# Patient Record
Sex: MALE | Race: WHITE | NOT HISPANIC OR LATINO
[De-identification: names, ages, dates, MRNs, and addresses within clinical notes are randomized per-mention and may not be internally consistent; named-entity substitution may affect disease eponyms.]

---

## 2017-06-02 ENCOUNTER — APPOINTMENT (OUTPATIENT)
Dept: HEART AND VASCULAR | Facility: CLINIC | Age: 82
End: 2017-06-02

## 2017-06-02 VITALS
BODY MASS INDEX: 20.09 KG/M2 | HEIGHT: 66 IN | DIASTOLIC BLOOD PRESSURE: 70 MMHG | WEIGHT: 125 LBS | OXYGEN SATURATION: 98 % | SYSTOLIC BLOOD PRESSURE: 118 MMHG | HEART RATE: 65 BPM | TEMPERATURE: 98.4 F

## 2018-04-16 ENCOUNTER — FORM ENCOUNTER (OUTPATIENT)
Age: 83
End: 2018-04-16

## 2018-04-17 ENCOUNTER — OUTPATIENT (OUTPATIENT)
Dept: OUTPATIENT SERVICES | Facility: HOSPITAL | Age: 83
LOS: 1 days | End: 2018-04-17
Payer: MEDICARE

## 2018-04-17 ENCOUNTER — APPOINTMENT (OUTPATIENT)
Dept: ORTHOPEDIC SURGERY | Facility: CLINIC | Age: 83
End: 2018-04-17
Payer: MEDICARE

## 2018-04-17 VITALS
BODY MASS INDEX: 20.83 KG/M2 | SYSTOLIC BLOOD PRESSURE: 122 MMHG | HEIGHT: 65 IN | DIASTOLIC BLOOD PRESSURE: 70 MMHG | WEIGHT: 125 LBS

## 2018-04-17 DIAGNOSIS — M76.30 ILIOTIBIAL BAND SYNDROME, UNSPECIFIED LEG: ICD-10-CM

## 2018-04-17 DIAGNOSIS — Z86.79 PERSONAL HISTORY OF OTHER DISEASES OF THE CIRCULATORY SYSTEM: ICD-10-CM

## 2018-04-17 DIAGNOSIS — Z87.39 PERSONAL HISTORY OF OTHER DISEASES OF THE MUSCULOSKELETAL SYSTEM AND CONNECTIVE TISSUE: ICD-10-CM

## 2018-04-17 DIAGNOSIS — M16.12 UNILATERAL PRIMARY OSTEOARTHRITIS, LEFT HIP: ICD-10-CM

## 2018-04-17 DIAGNOSIS — Z86.19 PERSONAL HISTORY OF OTHER INFECTIOUS AND PARASITIC DISEASES: ICD-10-CM

## 2018-04-17 DIAGNOSIS — Z86.2 PERSONAL HISTORY OF DISEASES OF THE BLOOD AND BLOOD-FORMING ORGANS AND CERTAIN DISORDERS INVOLVING THE IMMUNE MECHANISM: ICD-10-CM

## 2018-04-17 PROCEDURE — 72020 X-RAY EXAM OF SPINE 1 VIEW: CPT | Mod: 26

## 2018-04-17 PROCEDURE — 73502 X-RAY EXAM HIP UNI 2-3 VIEWS: CPT | Mod: 26,LT

## 2018-04-17 PROCEDURE — 73564 X-RAY EXAM KNEE 4 OR MORE: CPT

## 2018-04-17 PROCEDURE — 72020 X-RAY EXAM OF SPINE 1 VIEW: CPT

## 2018-04-17 PROCEDURE — 99203 OFFICE O/P NEW LOW 30 MIN: CPT

## 2018-04-17 PROCEDURE — 73502 X-RAY EXAM HIP UNI 2-3 VIEWS: CPT

## 2018-04-17 PROCEDURE — 73564 X-RAY EXAM KNEE 4 OR MORE: CPT | Mod: 26,50

## 2018-05-13 ENCOUNTER — FORM ENCOUNTER (OUTPATIENT)
Age: 83
End: 2018-05-13

## 2018-05-14 ENCOUNTER — OUTPATIENT (OUTPATIENT)
Dept: OUTPATIENT SERVICES | Facility: HOSPITAL | Age: 83
LOS: 1 days | End: 2018-05-14
Payer: MEDICARE

## 2018-05-14 DIAGNOSIS — M16.12 UNILATERAL PRIMARY OSTEOARTHRITIS, LEFT HIP: ICD-10-CM

## 2018-05-14 LAB
ANION GAP SERPL CALC-SCNC: 16 MMOL/L — SIGNIFICANT CHANGE UP (ref 5–17)
APPEARANCE UR: CLEAR — SIGNIFICANT CHANGE UP
APTT BLD: 28.8 SEC — SIGNIFICANT CHANGE UP (ref 27.5–37.4)
BACTERIA # UR AUTO: PRESENT /HPF
BILIRUB UR-MCNC: NEGATIVE — SIGNIFICANT CHANGE UP
BUN SERPL-MCNC: 22 MG/DL — SIGNIFICANT CHANGE UP (ref 7–23)
CALCIUM SERPL-MCNC: 10.2 MG/DL — SIGNIFICANT CHANGE UP (ref 8.4–10.5)
CHLORIDE SERPL-SCNC: 96 MMOL/L — SIGNIFICANT CHANGE UP (ref 96–108)
CO2 SERPL-SCNC: 25 MMOL/L — SIGNIFICANT CHANGE UP (ref 22–31)
COLOR SPEC: YELLOW — SIGNIFICANT CHANGE UP
CREAT SERPL-MCNC: 1.01 MG/DL — SIGNIFICANT CHANGE UP (ref 0.5–1.3)
DIFF PNL FLD: (no result)
EPI CELLS # UR: SIGNIFICANT CHANGE UP /HPF (ref 0–5)
GLUCOSE SERPL-MCNC: 96 MG/DL — SIGNIFICANT CHANGE UP (ref 70–99)
GLUCOSE UR QL: NEGATIVE — SIGNIFICANT CHANGE UP
HBA1C BLD-MCNC: 5 % — SIGNIFICANT CHANGE UP (ref 4–5.6)
HCT VFR BLD CALC: 37 % — LOW (ref 39–50)
HGB BLD-MCNC: 12.5 G/DL — LOW (ref 13–17)
INR BLD: 0.89 — SIGNIFICANT CHANGE UP (ref 0.88–1.16)
KETONES UR-MCNC: NEGATIVE — SIGNIFICANT CHANGE UP
LEUKOCYTE ESTERASE UR-ACNC: NEGATIVE — SIGNIFICANT CHANGE UP
MCHC RBC-ENTMCNC: 30.6 PG — SIGNIFICANT CHANGE UP (ref 27–34)
MCHC RBC-ENTMCNC: 33.8 G/DL — SIGNIFICANT CHANGE UP (ref 32–36)
MCV RBC AUTO: 90.5 FL — SIGNIFICANT CHANGE UP (ref 80–100)
NITRITE UR-MCNC: NEGATIVE — SIGNIFICANT CHANGE UP
PH UR: 6 — SIGNIFICANT CHANGE UP (ref 5–8)
PLATELET # BLD AUTO: 301 K/UL — SIGNIFICANT CHANGE UP (ref 150–400)
POTASSIUM SERPL-MCNC: 5.8 MMOL/L — HIGH (ref 3.5–5.3)
POTASSIUM SERPL-SCNC: 5.8 MMOL/L — HIGH (ref 3.5–5.3)
PROT UR-MCNC: NEGATIVE MG/DL — SIGNIFICANT CHANGE UP
PROTHROM AB SERPL-ACNC: 9.9 SEC — SIGNIFICANT CHANGE UP (ref 9.8–12.7)
RBC # BLD: 4.09 M/UL — LOW (ref 4.2–5.8)
RBC # FLD: 14.3 % — SIGNIFICANT CHANGE UP (ref 10.3–16.9)
RBC CASTS # UR COMP ASSIST: < 5 /HPF — SIGNIFICANT CHANGE UP
SODIUM SERPL-SCNC: 137 MMOL/L — SIGNIFICANT CHANGE UP (ref 135–145)
SP GR SPEC: 1.01 — SIGNIFICANT CHANGE UP (ref 1–1.03)
UROBILINOGEN FLD QL: 0.2 E.U./DL — SIGNIFICANT CHANGE UP
WBC # BLD: 5.3 K/UL — SIGNIFICANT CHANGE UP (ref 3.8–10.5)
WBC # FLD AUTO: 5.3 K/UL — SIGNIFICANT CHANGE UP (ref 3.8–10.5)
WBC UR QL: < 5 /HPF — SIGNIFICANT CHANGE UP

## 2018-05-14 PROCEDURE — 85730 THROMBOPLASTIN TIME PARTIAL: CPT

## 2018-05-14 PROCEDURE — 85610 PROTHROMBIN TIME: CPT

## 2018-05-14 PROCEDURE — 85027 COMPLETE CBC AUTOMATED: CPT

## 2018-05-14 PROCEDURE — 83036 HEMOGLOBIN GLYCOSYLATED A1C: CPT

## 2018-05-14 PROCEDURE — 93005 ELECTROCARDIOGRAM TRACING: CPT

## 2018-05-14 PROCEDURE — 71046 X-RAY EXAM CHEST 2 VIEWS: CPT | Mod: 26

## 2018-05-14 PROCEDURE — 81001 URINALYSIS AUTO W/SCOPE: CPT

## 2018-05-14 PROCEDURE — 71046 X-RAY EXAM CHEST 2 VIEWS: CPT

## 2018-05-14 PROCEDURE — 80048 BASIC METABOLIC PNL TOTAL CA: CPT

## 2018-05-14 PROCEDURE — 87086 URINE CULTURE/COLONY COUNT: CPT

## 2018-05-14 PROCEDURE — 93010 ELECTROCARDIOGRAM REPORT: CPT

## 2018-05-15 LAB
CULTURE RESULTS: NO GROWTH — SIGNIFICANT CHANGE UP
SPECIMEN SOURCE: SIGNIFICANT CHANGE UP

## 2018-05-16 ENCOUNTER — APPOINTMENT (OUTPATIENT)
Dept: HEART AND VASCULAR | Facility: CLINIC | Age: 83
End: 2018-05-16

## 2018-05-16 ENCOUNTER — APPOINTMENT (OUTPATIENT)
Dept: HEART AND VASCULAR | Facility: CLINIC | Age: 83
End: 2018-05-16
Payer: MEDICARE

## 2018-05-16 VITALS
OXYGEN SATURATION: 99 % | TEMPERATURE: 97.3 F | DIASTOLIC BLOOD PRESSURE: 80 MMHG | HEART RATE: 69 BPM | HEIGHT: 65 IN | WEIGHT: 125 LBS | BODY MASS INDEX: 20.83 KG/M2 | SYSTOLIC BLOOD PRESSURE: 142 MMHG

## 2018-05-16 PROCEDURE — 93306 TTE W/DOPPLER COMPLETE: CPT

## 2018-05-16 PROCEDURE — 99214 OFFICE O/P EST MOD 30 MIN: CPT | Mod: 25

## 2018-05-17 LAB
ANABASINE UR-MCNC: <2 NG/ML — SIGNIFICANT CHANGE UP
COTININE UR-MCNC: <5 NG/ML — SIGNIFICANT CHANGE UP
NICOTINE UR-MCNC: <5 NG/ML — SIGNIFICANT CHANGE UP
NORNICOTINE UR-MCNC: <2 NG/ML — SIGNIFICANT CHANGE UP

## 2018-05-21 ENCOUNTER — APPOINTMENT (OUTPATIENT)
Dept: HEART AND VASCULAR | Facility: CLINIC | Age: 83
End: 2018-05-21

## 2018-05-23 ENCOUNTER — APPOINTMENT (OUTPATIENT)
Dept: CARDIOTHORACIC SURGERY | Facility: CLINIC | Age: 83
End: 2018-05-23
Payer: MEDICARE

## 2018-05-23 VITALS
HEART RATE: 69 BPM | DIASTOLIC BLOOD PRESSURE: 68 MMHG | BODY MASS INDEX: 21.66 KG/M2 | HEIGHT: 65 IN | WEIGHT: 130 LBS | OXYGEN SATURATION: 99 % | SYSTOLIC BLOOD PRESSURE: 155 MMHG | RESPIRATION RATE: 18 BRPM | TEMPERATURE: 98.1 F

## 2018-05-23 PROCEDURE — 99204 OFFICE O/P NEW MOD 45 MIN: CPT

## 2018-06-04 ENCOUNTER — FORM ENCOUNTER (OUTPATIENT)
Age: 83
End: 2018-06-04

## 2018-06-04 RX ORDER — CHLORHEXIDINE GLUCONATE 213 G/1000ML
1 SOLUTION TOPICAL ONCE
Qty: 0 | Refills: 0 | Status: DISCONTINUED | OUTPATIENT
Start: 2018-06-05 | End: 2018-06-05

## 2018-06-05 ENCOUNTER — APPOINTMENT (OUTPATIENT)
Dept: CARDIOTHORACIC SURGERY | Facility: HOSPITAL | Age: 83
End: 2018-06-05
Payer: MEDICARE

## 2018-06-05 ENCOUNTER — OUTPATIENT (OUTPATIENT)
Dept: OUTPATIENT SERVICES | Facility: HOSPITAL | Age: 83
LOS: 1 days | Discharge: HOME | End: 2018-06-05
Payer: MEDICARE

## 2018-06-05 VITALS
RESPIRATION RATE: 16 BRPM | DIASTOLIC BLOOD PRESSURE: 73 MMHG | WEIGHT: 130.07 LBS | TEMPERATURE: 98 F | OXYGEN SATURATION: 100 % | HEIGHT: 65 IN | SYSTOLIC BLOOD PRESSURE: 177 MMHG | HEART RATE: 63 BPM

## 2018-06-05 DIAGNOSIS — Z90.89 ACQUIRED ABSENCE OF OTHER ORGANS: Chronic | ICD-10-CM

## 2018-06-05 DIAGNOSIS — Z90.49 ACQUIRED ABSENCE OF OTHER SPECIFIED PARTS OF DIGESTIVE TRACT: Chronic | ICD-10-CM

## 2018-06-05 DIAGNOSIS — Z41.9 ENCOUNTER FOR PROCEDURE FOR PURPOSES OTHER THAN REMEDYING HEALTH STATE, UNSPECIFIED: Chronic | ICD-10-CM

## 2018-06-05 LAB
APTT BLD: 31.5 SEC — SIGNIFICANT CHANGE UP (ref 27.5–37.4)
BASOPHILS NFR BLD AUTO: 0.3 % — SIGNIFICANT CHANGE UP (ref 0–2)
CHOLEST SERPL-MCNC: 230 MG/DL — HIGH (ref 10–199)
CK MB CFR SERPL CALC: 9.4 NG/ML — HIGH (ref 0–6.7)
CK SERPL-CCNC: 176 U/L — SIGNIFICANT CHANGE UP (ref 30–200)
CRP SERPL-MCNC: 0.19 MG/DL — SIGNIFICANT CHANGE UP (ref 0–0.4)
EOSINOPHIL NFR BLD AUTO: 3.1 % — SIGNIFICANT CHANGE UP (ref 0–6)
HBA1C BLD-MCNC: 4.6 % — SIGNIFICANT CHANGE UP (ref 4–5.6)
HCT VFR BLD CALC: 36.9 % — LOW (ref 39–50)
HDLC SERPL-MCNC: 110 MG/DL — SIGNIFICANT CHANGE UP (ref 40–125)
HGB BLD-MCNC: 12.3 G/DL — LOW (ref 13–17)
INR BLD: 0.9 — SIGNIFICANT CHANGE UP (ref 0.88–1.16)
LIPID PNL WITH DIRECT LDL SERPL: 112 MG/DL — SIGNIFICANT CHANGE UP
LYMPHOCYTES # BLD AUTO: 13.4 % — SIGNIFICANT CHANGE UP (ref 13–44)
MCHC RBC-ENTMCNC: 30.8 PG — SIGNIFICANT CHANGE UP (ref 27–34)
MCHC RBC-ENTMCNC: 33.3 G/DL — SIGNIFICANT CHANGE UP (ref 32–36)
MCV RBC AUTO: 92.5 FL — SIGNIFICANT CHANGE UP (ref 80–100)
MONOCYTES NFR BLD AUTO: 9.3 % — SIGNIFICANT CHANGE UP (ref 2–14)
NEUTROPHILS NFR BLD AUTO: 73.9 % — SIGNIFICANT CHANGE UP (ref 43–77)
PLATELET # BLD AUTO: 308 K/UL — SIGNIFICANT CHANGE UP (ref 150–400)
PROTHROM AB SERPL-ACNC: 10 SEC — SIGNIFICANT CHANGE UP (ref 9.8–12.7)
RBC # BLD: 3.99 M/UL — LOW (ref 4.2–5.8)
RBC # FLD: 13.8 % — SIGNIFICANT CHANGE UP (ref 10.3–16.9)
TOTAL CHOLESTEROL/HDL RATIO MEASUREMENT: 2.1 RATIO — LOW (ref 3.4–9.6)
TRIGL SERPL-MCNC: 39 MG/DL — SIGNIFICANT CHANGE UP (ref 10–149)
WBC # BLD: 6.4 K/UL — SIGNIFICANT CHANGE UP (ref 3.8–10.5)
WBC # FLD AUTO: 6.4 K/UL — SIGNIFICANT CHANGE UP (ref 3.8–10.5)

## 2018-06-05 PROCEDURE — 70450 CT HEAD/BRAIN W/O DYE: CPT | Mod: 26

## 2018-06-05 PROCEDURE — 74174 CTA ABD&PLVS W/CONTRAST: CPT | Mod: 26

## 2018-06-05 PROCEDURE — C1887: CPT

## 2018-06-05 PROCEDURE — 83036 HEMOGLOBIN GLYCOSYLATED A1C: CPT

## 2018-06-05 PROCEDURE — 82550 ASSAY OF CK (CPK): CPT

## 2018-06-05 PROCEDURE — 93458 L HRT ARTERY/VENTRICLE ANGIO: CPT | Mod: 26

## 2018-06-05 PROCEDURE — 85025 COMPLETE CBC W/AUTO DIFF WBC: CPT

## 2018-06-05 PROCEDURE — 86900 BLOOD TYPING SEROLOGIC ABO: CPT

## 2018-06-05 PROCEDURE — 82553 CREATINE MB FRACTION: CPT

## 2018-06-05 PROCEDURE — 80061 LIPID PANEL: CPT

## 2018-06-05 PROCEDURE — 70450 CT HEAD/BRAIN W/O DYE: CPT

## 2018-06-05 PROCEDURE — 75573 CT HRT C+ STRUX CGEN HRT DS: CPT | Mod: 26

## 2018-06-05 PROCEDURE — 85610 PROTHROMBIN TIME: CPT

## 2018-06-05 PROCEDURE — 85730 THROMBOPLASTIN TIME PARTIAL: CPT

## 2018-06-05 PROCEDURE — 75573 CT HRT C+ STRUX CGEN HRT DS: CPT

## 2018-06-05 PROCEDURE — 86140 C-REACTIVE PROTEIN: CPT

## 2018-06-05 PROCEDURE — 93454 CORONARY ARTERY ANGIO S&I: CPT

## 2018-06-05 PROCEDURE — 86901 BLOOD TYPING SEROLOGIC RH(D): CPT

## 2018-06-05 PROCEDURE — C1760: CPT

## 2018-06-05 PROCEDURE — 94150 VITAL CAPACITY TEST: CPT

## 2018-06-05 PROCEDURE — C1769: CPT

## 2018-06-05 PROCEDURE — 86850 RBC ANTIBODY SCREEN: CPT

## 2018-06-05 PROCEDURE — 93880 EXTRACRANIAL BILAT STUDY: CPT

## 2018-06-05 PROCEDURE — 94010 BREATHING CAPACITY TEST: CPT | Mod: 26

## 2018-06-05 PROCEDURE — 93880 EXTRACRANIAL BILAT STUDY: CPT | Mod: 26

## 2018-06-05 PROCEDURE — 74174 CTA ABD&PLVS W/CONTRAST: CPT

## 2018-06-05 PROCEDURE — 36415 COLL VENOUS BLD VENIPUNCTURE: CPT

## 2018-06-05 RX ORDER — ASPIRIN/CALCIUM CARB/MAGNESIUM 324 MG
81 TABLET ORAL ONCE
Qty: 0 | Refills: 0 | Status: COMPLETED | OUTPATIENT
Start: 2018-06-05 | End: 2018-06-05

## 2018-06-05 RX ORDER — CLOPIDOGREL BISULFATE 75 MG/1
300 TABLET, FILM COATED ORAL ONCE
Qty: 0 | Refills: 0 | Status: COMPLETED | OUTPATIENT
Start: 2018-06-05 | End: 2018-06-05

## 2018-06-05 RX ORDER — SODIUM CHLORIDE 9 MG/ML
500 INJECTION, SOLUTION INTRAVENOUS
Qty: 0 | Refills: 0 | Status: DISCONTINUED | OUTPATIENT
Start: 2018-06-05 | End: 2018-06-05

## 2018-06-05 RX ADMIN — Medication 81 MILLIGRAM(S): at 11:10

## 2018-06-05 RX ADMIN — CLOPIDOGREL BISULFATE 300 MILLIGRAM(S): 75 TABLET, FILM COATED ORAL at 11:10

## 2018-06-05 NOTE — BRIEF OPERATIVE NOTE - PROCEDURE
<<-----Click on this checkbox to enter Procedure Cardiac catheterization  06/05/2018    Active  CKLIGER

## 2018-06-05 NOTE — PROGRESS NOTE ADULT - SUBJECTIVE AND OBJECTIVE BOX
[facility] Interventional Cardiology Addendum Note to Structural Heart AMBI H&P:     Attending MD: Dr Joselo Murphy        S: Pt presents for Left Heart Catheterization (see office H&P for detailed History).  Pt reports that today he's asymptomatic.         Allergies    No Known Allergies    Current Medications:   Amlodipine 10mg daily  Aspirin 81mg daily  Lisinopril 20mg daily  Meloxicam 15mg daily  Omeprazole 20mg daily    PHYSICAL EXAM    V/S		BP:	177/75	        HR: 66	           RR: 	16	  TEMP:     General:   HEENT: NCAT, EOMI, PERRLA  NECK: No JVD  PULM:  CTA B/L No W/R/R  CARD: RRR, +S1 +S2, + systolic murmur  ABD: ND, +BS, NT, no masses  EXT: Warm, +1 b/l LE pitting edema  NEURO: A & O x 3, no focal neurologic deficits  PULSES:	     B	          R	      	  FEM          		           DP        PT       Right              	+2		  No/Yes	        Bruit	+1        +1          Left       	         	+2	  No/Yes	        Bruit	   	+1         +1	                                                              LABS:                         EKG:     ASA _III____				Mallampati class: ___III______	    A/P:  83 yo male PMHx HTN, h/o prostate CA tx with radiation 10 yrs ago, anemia, Hep B (tx 9 yrs ago), left hip arthritis, chronic diastolic CHF with severe AS is here for TAVR preop workup and cardiac catheterization. Pt precathed/cosented and loaded with Aspirin 81mg daily and Plavix 300mg prior to procedure as discussed with Dr. Murphy.         Sedation Plan:   ? None   x Moderate    ?  Deep    ?  General Anesthesia   Patient Is Suitable Candidate For Sedation?     x Yes   ? No   ? Not Applicable     Risks & benefits of procedure and sedation and risks and benefits for the alternative therapy have been explained to the patient in layman’s terms including but not limited to: allergic reaction, bleeding, infection, arrhythmia, respiratory compromise, renal and vascular compromise, limb damage, MI, CVA, emergent CABG/Vascular Surgery and death. Informed consent obtained and in chart. [facility] Interventional Cardiology Addendum Note to Structural Heart AMBI H&P:     Attending MD: Dr Joselo Murphy        S: Pt presents for Left Heart Catheterization (see office H&P for detailed History).  Pt reports that today he's asymptomatic.   at baseline reports fatigue, with limitation secondary to hip - consideration hip replacement.  Allergies    No Known Allergies    Current Medications:   Amlodipine 10mg daily  Aspirin 81mg daily  Lisinopril 20mg daily  Meloxicam 15mg daily  Omeprazole 20mg daily    PHYSICAL EXAM    V/S		BP:	177/75	        HR: 66	           RR: 	16	  TEMP:     General:   HEENT: NCAT, EOMI, PERRLA  NECK: No JVD  PULM:  CTA B/L No W/R/R  CARD: RRR, +S1 +S2, + systolic murmur  ABD: ND, +BS, NT, no masses  EXT: Warm, +1 b/l LE pitting edema  NEURO: A & O x 3, no focal neurologic deficits  PULSES:	     B	          R	      	  FEM          		           DP        PT       Right              	+2		  No/Yes	        Bruit	+1        +1          Left       	         	+2	  No/Yes	        Bruit	   	+1         +1	                                                              LABS:                         EKG:     ASA _III____				Mallampati class: ___III______	    A/P:  83 yo male PMHx HTN, h/o prostate CA tx with radiation 10 yrs ago, anemia, Hep B (tx 9 yrs ago), left hip arthritis, chronic diastolic CHF with severe AS is here for TAVR preop workup and cardiac catheterization. Pt precathed/cosented and loaded with Aspirin 81mg daily and Plavix 300mg prior to procedure as discussed with Dr. Murphy.         Sedation Plan:   ? None   x Moderate    ?  Deep    ?  General Anesthesia   Patient Is Suitable Candidate For Sedation?     x Yes   ? No   ? Not Applicable     Risks & benefits of procedure and sedation and risks and benefits for the alternative therapy have been explained to the patient in layman’s terms including but not limited to: allergic reaction, bleeding, infection, arrhythmia, respiratory compromise, renal and vascular compromise, limb damage, MI, CVA, emergent CABG/Vascular Surgery and death. Informed consent obtained and in chart.

## 2018-06-05 NOTE — PROGRESS NOTE ADULT - SUBJECTIVE AND OBJECTIVE BOX
Interventional Cardiology PA SDA Discharge Note    Patient without complaints. Ambulated and voided without difficulties    Afebrile, VSS    Ext:    		Right             Radial :  no  hematoma,    no bleeding, dressing; C/D/I      Pulses:    intact RAD to baseline     A/P:   83 yo male PMHx HTN, h/o prostate CA tx with radiation 10 yrs ago, anemia, Hep B (tx 9 yrs ago), left hip arthritis, chronic diastolic CHF with severe AS is here for TAVR preop workup and cardiac catheterization. Pt precathed/cosented and loaded with Aspirin 81mg daily and Plavix 300mg prior to procedure as discussed with Dr. Murphy. s/p diagnostic cath with pRCA 30%, mRCA 50%, mLAD 40%. R radial access. Pt to undergo TAVR workup and return for planned TAVR as per CTS.                  1.	Stable for discharge as per attending Dr. Murphy after bed rest, pt voids, wrist stable and 30 minutes of ambulation.  2.	Follow-up with PMD/Cardiologist Dr. Michel in 1-2 weeks  3.	Discharged forms signed and copies in chart

## 2018-06-05 NOTE — BRIEF OPERATIVE NOTE - OPERATION/FINDINGS
6F slender rt radial sheath placed.  rt dominant system, RCA: 30% proximal calcific diffuse, 50% mid diffuse calcfiic disease, LMCA long nml, LCx small, LAD 40% mid tubular stenosis after DIAG2  hemostasis: via radial band  imp: nonobstructive CAD; chronic diastolic CHF; severe aortic stenosis  plan;  -routine post-catheterization  -TAVR w/u  -CV surgical evaluation

## 2018-06-07 PROBLEM — M19.90 UNSPECIFIED OSTEOARTHRITIS, UNSPECIFIED SITE: Chronic | Status: ACTIVE | Noted: 2018-06-04

## 2018-06-07 PROBLEM — I35.0 NONRHEUMATIC AORTIC (VALVE) STENOSIS: Chronic | Status: ACTIVE | Noted: 2018-06-04

## 2018-06-13 VITALS
HEIGHT: 65 IN | OXYGEN SATURATION: 98 % | RESPIRATION RATE: 16 BRPM | TEMPERATURE: 98 F | WEIGHT: 130.07 LBS | SYSTOLIC BLOOD PRESSURE: 157 MMHG | DIASTOLIC BLOOD PRESSURE: 70 MMHG | HEART RATE: 66 BPM

## 2018-06-13 RX ORDER — LISINOPRIL 2.5 MG/1
1 TABLET ORAL
Qty: 0 | Refills: 0 | COMMUNITY

## 2018-06-13 RX ORDER — GLUCOSAMINE/MSM/CHONDROITIN A 750-375MG
0 TABLET ORAL
Qty: 0 | Refills: 0 | COMMUNITY

## 2018-06-13 RX ORDER — AMLODIPINE BESYLATE 2.5 MG/1
1 TABLET ORAL
Qty: 0 | Refills: 0 | COMMUNITY

## 2018-06-13 NOTE — PATIENT PROFILE ADULT. - PMH
Aortic stenosis    Hepatitis    Hypertension    ITB syndrome    OA (osteoarthritis) Aortic stenosis    GERD (gastroesophageal reflux disease)    Hepatitis  B  Hypertension    Lumbar herniated disc    OA (osteoarthritis)    Prostate cancer  radiation treatment

## 2018-06-14 ENCOUNTER — INPATIENT (INPATIENT)
Facility: HOSPITAL | Age: 83
LOS: 1 days | Discharge: HOME HEALTH | DRG: 267 | End: 2018-06-16
Attending: INTERNAL MEDICINE | Admitting: INTERNAL MEDICINE
Payer: MEDICARE

## 2018-06-14 ENCOUNTER — APPOINTMENT (OUTPATIENT)
Dept: CARDIOTHORACIC SURGERY | Facility: HOSPITAL | Age: 83
End: 2018-06-14
Payer: MEDICARE

## 2018-06-14 ENCOUNTER — APPOINTMENT (OUTPATIENT)
Dept: CARDIOTHORACIC SURGERY | Facility: HOSPITAL | Age: 83
End: 2018-06-14

## 2018-06-14 DIAGNOSIS — Z90.49 ACQUIRED ABSENCE OF OTHER SPECIFIED PARTS OF DIGESTIVE TRACT: Chronic | ICD-10-CM

## 2018-06-14 DIAGNOSIS — Z41.9 ENCOUNTER FOR PROCEDURE FOR PURPOSES OTHER THAN REMEDYING HEALTH STATE, UNSPECIFIED: Chronic | ICD-10-CM

## 2018-06-14 DIAGNOSIS — Z00.6 ENCOUNTER FOR EXAMINATION FOR NORMAL COMPARISON AND CONTROL IN CLINICAL RESEARCH PROGRAM: ICD-10-CM

## 2018-06-14 DIAGNOSIS — Z90.89 ACQUIRED ABSENCE OF OTHER ORGANS: Chronic | ICD-10-CM

## 2018-06-14 PROBLEM — K75.9 INFLAMMATORY LIVER DISEASE, UNSPECIFIED: Chronic | Status: ACTIVE | Noted: 2018-06-04

## 2018-06-14 LAB
ALBUMIN SERPL ELPH-MCNC: 4 G/DL — SIGNIFICANT CHANGE UP (ref 3.3–5)
ALBUMIN SERPL ELPH-MCNC: 4.1 G/DL — SIGNIFICANT CHANGE UP (ref 3.3–5)
ALBUMIN SERPL ELPH-MCNC: 4.2 G/DL — SIGNIFICANT CHANGE UP (ref 3.3–5)
ALP SERPL-CCNC: 34 U/L — LOW (ref 40–120)
ALP SERPL-CCNC: 35 U/L — LOW (ref 40–120)
ALP SERPL-CCNC: 36 U/L — LOW (ref 40–120)
ALT FLD-CCNC: 15 U/L — SIGNIFICANT CHANGE UP (ref 10–45)
ALT FLD-CCNC: 16 U/L — SIGNIFICANT CHANGE UP (ref 10–45)
ALT FLD-CCNC: 16 U/L — SIGNIFICANT CHANGE UP (ref 10–45)
ANION GAP SERPL CALC-SCNC: 13 MMOL/L — SIGNIFICANT CHANGE UP (ref 5–17)
ANION GAP SERPL CALC-SCNC: 13 MMOL/L — SIGNIFICANT CHANGE UP (ref 5–17)
ANION GAP SERPL CALC-SCNC: 14 MMOL/L — SIGNIFICANT CHANGE UP (ref 5–17)
ANION GAP SERPL CALC-SCNC: 15 MMOL/L — SIGNIFICANT CHANGE UP (ref 5–17)
APTT BLD: 28.5 SEC — SIGNIFICANT CHANGE UP (ref 27.5–37.4)
APTT BLD: 29.2 SEC — SIGNIFICANT CHANGE UP (ref 27.5–37.4)
APTT BLD: 31.5 SEC — SIGNIFICANT CHANGE UP (ref 27.5–37.4)
APTT BLD: 33.3 SEC — SIGNIFICANT CHANGE UP (ref 27.5–37.4)
AST SERPL-CCNC: 23 U/L — SIGNIFICANT CHANGE UP (ref 10–40)
AST SERPL-CCNC: 24 U/L — SIGNIFICANT CHANGE UP (ref 10–40)
AST SERPL-CCNC: 26 U/L — SIGNIFICANT CHANGE UP (ref 10–40)
BILIRUB SERPL-MCNC: 0.5 MG/DL — SIGNIFICANT CHANGE UP (ref 0.2–1.2)
BILIRUB SERPL-MCNC: 0.6 MG/DL — SIGNIFICANT CHANGE UP (ref 0.2–1.2)
BILIRUB SERPL-MCNC: 0.7 MG/DL — SIGNIFICANT CHANGE UP (ref 0.2–1.2)
BLD GP AB SCN SERPL QL: NEGATIVE — SIGNIFICANT CHANGE UP
BUN SERPL-MCNC: 15 MG/DL — SIGNIFICANT CHANGE UP (ref 7–23)
BUN SERPL-MCNC: 15 MG/DL — SIGNIFICANT CHANGE UP (ref 7–23)
BUN SERPL-MCNC: 17 MG/DL — SIGNIFICANT CHANGE UP (ref 7–23)
BUN SERPL-MCNC: 22 MG/DL — SIGNIFICANT CHANGE UP (ref 7–23)
CALCIUM SERPL-MCNC: 10 MG/DL — SIGNIFICANT CHANGE UP (ref 8.4–10.5)
CALCIUM SERPL-MCNC: 8.9 MG/DL — SIGNIFICANT CHANGE UP (ref 8.4–10.5)
CALCIUM SERPL-MCNC: 9.1 MG/DL — SIGNIFICANT CHANGE UP (ref 8.4–10.5)
CALCIUM SERPL-MCNC: 9.5 MG/DL — SIGNIFICANT CHANGE UP (ref 8.4–10.5)
CHLORIDE SERPL-SCNC: 94 MMOL/L — LOW (ref 96–108)
CHLORIDE SERPL-SCNC: 95 MMOL/L — LOW (ref 96–108)
CO2 SERPL-SCNC: 24 MMOL/L — SIGNIFICANT CHANGE UP (ref 22–31)
CO2 SERPL-SCNC: 25 MMOL/L — SIGNIFICANT CHANGE UP (ref 22–31)
CO2 SERPL-SCNC: 26 MMOL/L — SIGNIFICANT CHANGE UP (ref 22–31)
CO2 SERPL-SCNC: 27 MMOL/L — SIGNIFICANT CHANGE UP (ref 22–31)
CREAT SERPL-MCNC: 0.84 MG/DL — SIGNIFICANT CHANGE UP (ref 0.5–1.3)
CREAT SERPL-MCNC: 0.88 MG/DL — SIGNIFICANT CHANGE UP (ref 0.5–1.3)
CREAT SERPL-MCNC: 0.98 MG/DL — SIGNIFICANT CHANGE UP (ref 0.5–1.3)
CREAT SERPL-MCNC: 1.06 MG/DL — SIGNIFICANT CHANGE UP (ref 0.5–1.3)
EOSINOPHIL NFR BLD AUTO: 9 % — HIGH (ref 0–6)
GAS PNL BLDA: SIGNIFICANT CHANGE UP
GLUCOSE SERPL-MCNC: 103 MG/DL — HIGH (ref 70–99)
GLUCOSE SERPL-MCNC: 113 MG/DL — HIGH (ref 70–99)
GLUCOSE SERPL-MCNC: 133 MG/DL — HIGH (ref 70–99)
GLUCOSE SERPL-MCNC: 97 MG/DL — SIGNIFICANT CHANGE UP (ref 70–99)
HCT VFR BLD CALC: 31.7 % — LOW (ref 39–50)
HCT VFR BLD CALC: 31.8 % — LOW (ref 39–50)
HCT VFR BLD CALC: 33 % — LOW (ref 39–50)
HCT VFR BLD CALC: 36.2 % — LOW (ref 39–50)
HGB BLD-MCNC: 10.9 G/DL — LOW (ref 13–17)
HGB BLD-MCNC: 12.1 G/DL — LOW (ref 13–17)
INR BLD: 0.91 — SIGNIFICANT CHANGE UP (ref 0.88–1.16)
INR BLD: 0.97 — SIGNIFICANT CHANGE UP (ref 0.88–1.16)
INR BLD: 0.99 — SIGNIFICANT CHANGE UP (ref 0.88–1.16)
INR BLD: 1 — SIGNIFICANT CHANGE UP (ref 0.88–1.16)
LACTATE SERPL-SCNC: 0.7 MMOL/L — SIGNIFICANT CHANGE UP (ref 0.5–2)
LACTATE SERPL-SCNC: 0.9 MMOL/L — SIGNIFICANT CHANGE UP (ref 0.5–2)
LYMPHOCYTES # BLD AUTO: 13 % — SIGNIFICANT CHANGE UP (ref 13–44)
MAGNESIUM SERPL-MCNC: 1.8 MG/DL — SIGNIFICANT CHANGE UP (ref 1.6–2.6)
MAGNESIUM SERPL-MCNC: 1.8 MG/DL — SIGNIFICANT CHANGE UP (ref 1.6–2.6)
MAGNESIUM SERPL-MCNC: 1.9 MG/DL — SIGNIFICANT CHANGE UP (ref 1.6–2.6)
MAGNESIUM SERPL-MCNC: 1.9 MG/DL — SIGNIFICANT CHANGE UP (ref 1.6–2.6)
MCHC RBC-ENTMCNC: 30.3 PG — SIGNIFICANT CHANGE UP (ref 27–34)
MCHC RBC-ENTMCNC: 30.5 PG — SIGNIFICANT CHANGE UP (ref 27–34)
MCHC RBC-ENTMCNC: 31 PG — SIGNIFICANT CHANGE UP (ref 27–34)
MCHC RBC-ENTMCNC: 31.3 PG — SIGNIFICANT CHANGE UP (ref 27–34)
MCHC RBC-ENTMCNC: 33 G/DL — SIGNIFICANT CHANGE UP (ref 32–36)
MCHC RBC-ENTMCNC: 33.4 G/DL — SIGNIFICANT CHANGE UP (ref 32–36)
MCHC RBC-ENTMCNC: 34.3 G/DL — SIGNIFICANT CHANGE UP (ref 32–36)
MCHC RBC-ENTMCNC: 34.4 G/DL — SIGNIFICANT CHANGE UP (ref 32–36)
MCV RBC AUTO: 90.1 FL — SIGNIFICANT CHANGE UP (ref 80–100)
MCV RBC AUTO: 91.2 FL — SIGNIFICANT CHANGE UP (ref 80–100)
MCV RBC AUTO: 91.4 FL — SIGNIFICANT CHANGE UP (ref 80–100)
MCV RBC AUTO: 91.7 FL — SIGNIFICANT CHANGE UP (ref 80–100)
MONOCYTES NFR BLD AUTO: 5 % — SIGNIFICANT CHANGE UP (ref 2–14)
NEUTROPHILS NFR BLD AUTO: 71 % — SIGNIFICANT CHANGE UP (ref 43–77)
NT-PROBNP SERPL-SCNC: 122 PG/ML — SIGNIFICANT CHANGE UP (ref 0–300)
PHOSPHATE SERPL-MCNC: 3.7 MG/DL — SIGNIFICANT CHANGE UP (ref 2.5–4.5)
PHOSPHATE SERPL-MCNC: 3.8 MG/DL — SIGNIFICANT CHANGE UP (ref 2.5–4.5)
PHOSPHATE SERPL-MCNC: 4.2 MG/DL — SIGNIFICANT CHANGE UP (ref 2.5–4.5)
PLATELET # BLD AUTO: 209 K/UL — SIGNIFICANT CHANGE UP (ref 150–400)
PLATELET # BLD AUTO: 216 K/UL — SIGNIFICANT CHANGE UP (ref 150–400)
PLATELET # BLD AUTO: 219 K/UL — SIGNIFICANT CHANGE UP (ref 150–400)
PLATELET # BLD AUTO: 244 K/UL — SIGNIFICANT CHANGE UP (ref 150–400)
POTASSIUM SERPL-MCNC: 4 MMOL/L — SIGNIFICANT CHANGE UP (ref 3.5–5.3)
POTASSIUM SERPL-MCNC: 4 MMOL/L — SIGNIFICANT CHANGE UP (ref 3.5–5.3)
POTASSIUM SERPL-MCNC: 4.1 MMOL/L — SIGNIFICANT CHANGE UP (ref 3.5–5.3)
POTASSIUM SERPL-MCNC: 4.9 MMOL/L — SIGNIFICANT CHANGE UP (ref 3.5–5.3)
POTASSIUM SERPL-SCNC: 4 MMOL/L — SIGNIFICANT CHANGE UP (ref 3.5–5.3)
POTASSIUM SERPL-SCNC: 4 MMOL/L — SIGNIFICANT CHANGE UP (ref 3.5–5.3)
POTASSIUM SERPL-SCNC: 4.1 MMOL/L — SIGNIFICANT CHANGE UP (ref 3.5–5.3)
POTASSIUM SERPL-SCNC: 4.9 MMOL/L — SIGNIFICANT CHANGE UP (ref 3.5–5.3)
PROT SERPL-MCNC: 6.2 G/DL — SIGNIFICANT CHANGE UP (ref 6–8.3)
PROT SERPL-MCNC: 6.3 G/DL — SIGNIFICANT CHANGE UP (ref 6–8.3)
PROT SERPL-MCNC: 6.5 G/DL — SIGNIFICANT CHANGE UP (ref 6–8.3)
PROTHROM AB SERPL-ACNC: 10.1 SEC — SIGNIFICANT CHANGE UP (ref 9.8–12.7)
PROTHROM AB SERPL-ACNC: 10.8 SEC — SIGNIFICANT CHANGE UP (ref 9.8–12.7)
PROTHROM AB SERPL-ACNC: 11 SEC — SIGNIFICANT CHANGE UP (ref 9.8–12.7)
PROTHROM AB SERPL-ACNC: 11.1 SEC — SIGNIFICANT CHANGE UP (ref 9.8–12.7)
RBC # BLD: 3.48 M/UL — LOW (ref 4.2–5.8)
RBC # BLD: 3.52 M/UL — LOW (ref 4.2–5.8)
RBC # BLD: 3.6 M/UL — LOW (ref 4.2–5.8)
RBC # BLD: 3.97 M/UL — LOW (ref 4.2–5.8)
RBC # FLD: 13 % — SIGNIFICANT CHANGE UP (ref 10.3–16.9)
RBC # FLD: 13.1 % — SIGNIFICANT CHANGE UP (ref 10.3–16.9)
RBC # FLD: 13.5 % — SIGNIFICANT CHANGE UP (ref 10.3–16.9)
RBC # FLD: 13.6 % — SIGNIFICANT CHANGE UP (ref 10.3–16.9)
RH IG SCN BLD-IMP: POSITIVE — SIGNIFICANT CHANGE UP
SODIUM SERPL-SCNC: 132 MMOL/L — LOW (ref 135–145)
SODIUM SERPL-SCNC: 132 MMOL/L — LOW (ref 135–145)
SODIUM SERPL-SCNC: 134 MMOL/L — LOW (ref 135–145)
SODIUM SERPL-SCNC: 136 MMOL/L — SIGNIFICANT CHANGE UP (ref 135–145)
WBC # BLD: 10.3 K/UL — SIGNIFICANT CHANGE UP (ref 3.8–10.5)
WBC # BLD: 4.5 K/UL — SIGNIFICANT CHANGE UP (ref 3.8–10.5)
WBC # BLD: 5.4 K/UL — SIGNIFICANT CHANGE UP (ref 3.8–10.5)
WBC # BLD: 8.3 K/UL — SIGNIFICANT CHANGE UP (ref 3.8–10.5)
WBC # FLD AUTO: 10.3 K/UL — SIGNIFICANT CHANGE UP (ref 3.8–10.5)
WBC # FLD AUTO: 4.5 K/UL — SIGNIFICANT CHANGE UP (ref 3.8–10.5)
WBC # FLD AUTO: 5.4 K/UL — SIGNIFICANT CHANGE UP (ref 3.8–10.5)
WBC # FLD AUTO: 8.3 K/UL — SIGNIFICANT CHANGE UP (ref 3.8–10.5)

## 2018-06-14 PROCEDURE — 33361 REPLACE AORTIC VALVE PERQ: CPT | Mod: 62,Q0

## 2018-06-14 PROCEDURE — 99238 HOSP IP/OBS DSCHRG MGMT 30/<: CPT | Mod: 24

## 2018-06-14 PROCEDURE — 93325 DOPPLER ECHO COLOR FLOW MAPG: CPT | Mod: 26

## 2018-06-14 PROCEDURE — 71045 X-RAY EXAM CHEST 1 VIEW: CPT | Mod: 26

## 2018-06-14 PROCEDURE — 93010 ELECTROCARDIOGRAM REPORT: CPT

## 2018-06-14 PROCEDURE — 93312 ECHO TRANSESOPHAGEAL: CPT | Mod: 26

## 2018-06-14 PROCEDURE — 93320 DOPPLER ECHO COMPLETE: CPT | Mod: 26

## 2018-06-14 PROCEDURE — 99291 CRITICAL CARE FIRST HOUR: CPT

## 2018-06-14 RX ORDER — MEPERIDINE HYDROCHLORIDE 50 MG/ML
25 INJECTION INTRAMUSCULAR; INTRAVENOUS; SUBCUTANEOUS ONCE
Qty: 0 | Refills: 0 | Status: DISCONTINUED | OUTPATIENT
Start: 2018-06-14 | End: 2018-06-14

## 2018-06-14 RX ORDER — ACETAMINOPHEN 500 MG
650 TABLET ORAL EVERY 6 HOURS
Qty: 0 | Refills: 0 | Status: DISCONTINUED | OUTPATIENT
Start: 2018-06-14 | End: 2018-06-16

## 2018-06-14 RX ORDER — SODIUM CHLORIDE 9 MG/ML
1000 INJECTION INTRAMUSCULAR; INTRAVENOUS; SUBCUTANEOUS
Qty: 0 | Refills: 0 | Status: DISCONTINUED | OUTPATIENT
Start: 2018-06-14 | End: 2018-06-15

## 2018-06-14 RX ORDER — CELECOXIB 200 MG/1
200 CAPSULE ORAL
Qty: 0 | Refills: 0 | Status: DISCONTINUED | OUTPATIENT
Start: 2018-06-14 | End: 2018-06-15

## 2018-06-14 RX ORDER — CEFAZOLIN SODIUM 1 G
2000 VIAL (EA) INJECTION EVERY 8 HOURS
Qty: 0 | Refills: 0 | Status: COMPLETED | OUTPATIENT
Start: 2018-06-14 | End: 2018-06-15

## 2018-06-14 RX ORDER — ASPIRIN/CALCIUM CARB/MAGNESIUM 324 MG
81 TABLET ORAL DAILY
Qty: 0 | Refills: 0 | Status: DISCONTINUED | OUTPATIENT
Start: 2018-06-14 | End: 2018-06-16

## 2018-06-14 RX ORDER — CHLORHEXIDINE GLUCONATE 213 G/1000ML
5 SOLUTION TOPICAL EVERY 4 HOURS
Qty: 0 | Refills: 0 | Status: DISCONTINUED | OUTPATIENT
Start: 2018-06-14 | End: 2018-06-14

## 2018-06-14 RX ORDER — CLOPIDOGREL BISULFATE 75 MG/1
300 TABLET, FILM COATED ORAL ONCE
Qty: 0 | Refills: 0 | Status: COMPLETED | OUTPATIENT
Start: 2018-06-14 | End: 2018-06-14

## 2018-06-14 RX ORDER — POTASSIUM CHLORIDE 20 MEQ
20 PACKET (EA) ORAL ONCE
Qty: 0 | Refills: 0 | Status: COMPLETED | OUTPATIENT
Start: 2018-06-14 | End: 2018-06-14

## 2018-06-14 RX ORDER — CLOPIDOGREL BISULFATE 75 MG/1
75 TABLET, FILM COATED ORAL DAILY
Qty: 0 | Refills: 0 | Status: DISCONTINUED | OUTPATIENT
Start: 2018-06-15 | End: 2018-06-16

## 2018-06-14 RX ORDER — PANTOPRAZOLE SODIUM 20 MG/1
40 TABLET, DELAYED RELEASE ORAL
Qty: 0 | Refills: 0 | Status: DISCONTINUED | OUTPATIENT
Start: 2018-06-14 | End: 2018-06-16

## 2018-06-14 RX ORDER — ASPIRIN/CALCIUM CARB/MAGNESIUM 324 MG
81 TABLET ORAL ONCE
Qty: 0 | Refills: 0 | Status: COMPLETED | OUTPATIENT
Start: 2018-06-14 | End: 2018-06-14

## 2018-06-14 RX ORDER — MAGNESIUM SULFATE 500 MG/ML
2 VIAL (ML) INJECTION ONCE
Qty: 0 | Refills: 0 | Status: COMPLETED | OUTPATIENT
Start: 2018-06-14 | End: 2018-06-14

## 2018-06-14 RX ORDER — HEPARIN SODIUM 5000 [USP'U]/ML
5000 INJECTION INTRAVENOUS; SUBCUTANEOUS EVERY 8 HOURS
Qty: 0 | Refills: 0 | Status: DISCONTINUED | OUTPATIENT
Start: 2018-06-14 | End: 2018-06-16

## 2018-06-14 RX ADMIN — Medication 650 MILLIGRAM(S): at 19:10

## 2018-06-14 RX ADMIN — Medication 650 MILLIGRAM(S): at 20:05

## 2018-06-14 RX ADMIN — Medication 81 MILLIGRAM(S): at 11:49

## 2018-06-14 RX ADMIN — Medication 50 GRAM(S): at 23:00

## 2018-06-14 RX ADMIN — Medication 50 GRAM(S): at 19:11

## 2018-06-14 RX ADMIN — CELECOXIB 200 MILLIGRAM(S): 200 CAPSULE ORAL at 20:05

## 2018-06-14 RX ADMIN — Medication 100 MILLIGRAM(S): at 21:41

## 2018-06-14 RX ADMIN — CELECOXIB 200 MILLIGRAM(S): 200 CAPSULE ORAL at 19:15

## 2018-06-14 RX ADMIN — CLOPIDOGREL BISULFATE 300 MILLIGRAM(S): 75 TABLET, FILM COATED ORAL at 19:10

## 2018-06-14 RX ADMIN — HEPARIN SODIUM 5000 UNIT(S): 5000 INJECTION INTRAVENOUS; SUBCUTANEOUS at 21:44

## 2018-06-14 RX ADMIN — Medication 20 MILLIEQUIVALENT(S): at 16:19

## 2018-06-14 NOTE — H&P ADULT - NSHPREVIEWOFSYSTEMS_GEN_ALL_CORE
MSK: joint pain  Constituional, Eyes, ENT, Cardiovascular, Respiratory, GI, , INtegumentary, Neuro, Psych, Endocrine, and Heme/Lymph are otherwise negative MSK: joint pain  Constituional, Eyes, ENT, Cardiovascular, Respiratory, GI, , Integumentary, Neuro, Psych, Endocrine, and Heme/Lymph are otherwise negative

## 2018-06-14 NOTE — H&P ADULT - ASSESSMENT
A/P: 81 y/o male with a histroy of hypertension, history of prostate cancer s/p radiation 10 years ago, anemia (baseline Hgb unknown), Hepatitis B (treated 9 years ago), left hip arthritis, chronic diastolic heart failure with severe aortic stenosis who was referred for further evaluation of his valvular disease.  THe patient is clinically stable, NYHA Class I.  It is difficult to truly address if the patient is symptomatic given his limited mobility 2/2 his arthritis.  However, given his abrnormal echo and the severity (near critical) of his vaulvular disease, Dr. Murphy believes the patinet is high risk to undergo any surgery and recommends addressing his aortic valve disease first.  Given the patient's age and comorbidities, Dr. Murphy believes the patient is a candidate for TAVR.   - NPO since last night   - consented for TAVR and related procedures   - full set of labs including T&S, CBC, CMP, and coags   - EKG   - blood and blood products on hold for OR

## 2018-06-14 NOTE — H&P ADULT - HISTORY OF PRESENT ILLNESS
83 y/o male with a histroy of hypertension, history of prostate cancer s/p radiation 10 years ago, anemia (baseline Hgb unknown), Hepatitis B (treated 9 years ago), left hip arthritis, chronic diastolic heart failure with severe aortic stenosis who was referred for further evaluation of his valvular disease.    Form a cardiac standpoint, the patient states he is feeling well.  He denies any CP, SOB, palpitations, dizziness, or syncope.  THe patient does admit to +LE edema after starting amlodipine for the last month.  However, the patient's mobility is limited by severe left hip/knee pain.  For the last 6 weeks, the patient has experienced left hip/knee pain and was seen by Dr. Lubin who recommended surgery which is scheduled in 2 weeks.  Prior to surgery, the patient went to his cardiologiust for cardiac clearance.  Echo on 5/16/18 which showed severe aortic stenosis with worsening gradients, mean gradient of 53 mmHg and peak gradient of 94mmHg.    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  The patient continues to drive and is independent in all ADLs

## 2018-06-14 NOTE — H&P ADULT - NSHPPHYSICALEXAM_GEN_ALL_CORE
Constitutional: no acute distress  Eyes: the conjunctiva exhibited no abnormalities  HEENT: normal oral mucosa  Neck: normal jugular venous A waves present and normal jugular vneous V waves present.  + carotid bruit bilaterally  Cardiovascular: heart rate and rhythm were normal and normal S1 and S2, III/VI LIZZIE, 1+ pitting edema bilaterally  Pulm: no respiratory distress, normal respiratory rhythm and effort, no accessory muscle use and lungs were clear to auscultation bilaterally  Abdomen: normal bowel sounds, soft, non-tender and no HSM  MSK: normal gait  Skin: no rash  Psych: not feeling anxious

## 2018-06-14 NOTE — H&P ADULT - PMH
Aortic stenosis    GERD (gastroesophageal reflux disease)    Hepatitis  B  Hypertension    Lumbar herniated disc    OA (osteoarthritis)    Prostate cancer  radiation treatment

## 2018-06-15 LAB
ALBUMIN SERPL ELPH-MCNC: 3.8 G/DL — SIGNIFICANT CHANGE UP (ref 3.3–5)
ALP SERPL-CCNC: 34 U/L — LOW (ref 40–120)
ALT FLD-CCNC: 14 U/L — SIGNIFICANT CHANGE UP (ref 10–45)
ANION GAP SERPL CALC-SCNC: 13 MMOL/L — SIGNIFICANT CHANGE UP (ref 5–17)
ANION GAP SERPL CALC-SCNC: 9 MMOL/L — SIGNIFICANT CHANGE UP (ref 5–17)
APTT BLD: 28.7 SEC — SIGNIFICANT CHANGE UP (ref 27.5–37.4)
AST SERPL-CCNC: 23 U/L — SIGNIFICANT CHANGE UP (ref 10–40)
BILIRUB SERPL-MCNC: 0.5 MG/DL — SIGNIFICANT CHANGE UP (ref 0.2–1.2)
BUN SERPL-MCNC: 17 MG/DL — SIGNIFICANT CHANGE UP (ref 7–23)
BUN SERPL-MCNC: 21 MG/DL — SIGNIFICANT CHANGE UP (ref 7–23)
CALCIUM SERPL-MCNC: 8.6 MG/DL — SIGNIFICANT CHANGE UP (ref 8.4–10.5)
CALCIUM SERPL-MCNC: 9 MG/DL — SIGNIFICANT CHANGE UP (ref 8.4–10.5)
CHLORIDE SERPL-SCNC: 94 MMOL/L — LOW (ref 96–108)
CHLORIDE SERPL-SCNC: 97 MMOL/L — SIGNIFICANT CHANGE UP (ref 96–108)
CO2 SERPL-SCNC: 25 MMOL/L — SIGNIFICANT CHANGE UP (ref 22–31)
CO2 SERPL-SCNC: 27 MMOL/L — SIGNIFICANT CHANGE UP (ref 22–31)
CREAT SERPL-MCNC: 0.89 MG/DL — SIGNIFICANT CHANGE UP (ref 0.5–1.3)
CREAT SERPL-MCNC: 1.01 MG/DL — SIGNIFICANT CHANGE UP (ref 0.5–1.3)
GAS PNL BLDA: SIGNIFICANT CHANGE UP
GLUCOSE SERPL-MCNC: 100 MG/DL — HIGH (ref 70–99)
GLUCOSE SERPL-MCNC: 112 MG/DL — HIGH (ref 70–99)
HCT VFR BLD CALC: 30.4 % — LOW (ref 39–50)
HCT VFR BLD CALC: 30.7 % — LOW (ref 39–50)
HGB BLD-MCNC: 10.2 G/DL — LOW (ref 13–17)
HGB BLD-MCNC: 10.6 G/DL — LOW (ref 13–17)
INR BLD: 0.96 — SIGNIFICANT CHANGE UP (ref 0.88–1.16)
INR BLD: 0.96 — SIGNIFICANT CHANGE UP (ref 0.88–1.16)
LACTATE SERPL-SCNC: 0.6 MMOL/L — SIGNIFICANT CHANGE UP (ref 0.5–2)
MAGNESIUM SERPL-MCNC: 2.2 MG/DL — SIGNIFICANT CHANGE UP (ref 1.6–2.6)
MAGNESIUM SERPL-MCNC: 2.6 MG/DL — SIGNIFICANT CHANGE UP (ref 1.6–2.6)
MCHC RBC-ENTMCNC: 30.7 PG — SIGNIFICANT CHANGE UP (ref 27–34)
MCHC RBC-ENTMCNC: 31 PG — SIGNIFICANT CHANGE UP (ref 27–34)
MCHC RBC-ENTMCNC: 33.6 G/DL — SIGNIFICANT CHANGE UP (ref 32–36)
MCHC RBC-ENTMCNC: 34.5 G/DL — SIGNIFICANT CHANGE UP (ref 32–36)
MCV RBC AUTO: 89.8 FL — SIGNIFICANT CHANGE UP (ref 80–100)
MCV RBC AUTO: 91.6 FL — SIGNIFICANT CHANGE UP (ref 80–100)
PHOSPHATE SERPL-MCNC: 3.5 MG/DL — SIGNIFICANT CHANGE UP (ref 2.5–4.5)
PHOSPHATE SERPL-MCNC: 4.8 MG/DL — HIGH (ref 2.5–4.5)
PLATELET # BLD AUTO: 181 K/UL — SIGNIFICANT CHANGE UP (ref 150–400)
PLATELET # BLD AUTO: 210 K/UL — SIGNIFICANT CHANGE UP (ref 150–400)
POTASSIUM SERPL-MCNC: 3.9 MMOL/L — SIGNIFICANT CHANGE UP (ref 3.5–5.3)
POTASSIUM SERPL-MCNC: 4.6 MMOL/L — SIGNIFICANT CHANGE UP (ref 3.5–5.3)
POTASSIUM SERPL-SCNC: 3.9 MMOL/L — SIGNIFICANT CHANGE UP (ref 3.5–5.3)
POTASSIUM SERPL-SCNC: 4.6 MMOL/L — SIGNIFICANT CHANGE UP (ref 3.5–5.3)
PROT SERPL-MCNC: 6.1 G/DL — SIGNIFICANT CHANGE UP (ref 6–8.3)
PROTHROM AB SERPL-ACNC: 10.6 SEC — SIGNIFICANT CHANGE UP (ref 9.8–12.7)
PROTHROM AB SERPL-ACNC: 10.7 SEC — SIGNIFICANT CHANGE UP (ref 9.8–12.7)
RBC # BLD: 3.32 M/UL — LOW (ref 4.2–5.8)
RBC # BLD: 3.42 M/UL — LOW (ref 4.2–5.8)
RBC # FLD: 12.9 % — SIGNIFICANT CHANGE UP (ref 10.3–16.9)
RBC # FLD: 13.3 % — SIGNIFICANT CHANGE UP (ref 10.3–16.9)
SODIUM SERPL-SCNC: 132 MMOL/L — LOW (ref 135–145)
SODIUM SERPL-SCNC: 133 MMOL/L — LOW (ref 135–145)
WBC # BLD: 7.1 K/UL — SIGNIFICANT CHANGE UP (ref 3.8–10.5)
WBC # BLD: 7.4 K/UL — SIGNIFICANT CHANGE UP (ref 3.8–10.5)
WBC # FLD AUTO: 7.1 K/UL — SIGNIFICANT CHANGE UP (ref 3.8–10.5)
WBC # FLD AUTO: 7.4 K/UL — SIGNIFICANT CHANGE UP (ref 3.8–10.5)

## 2018-06-15 PROCEDURE — 99291 CRITICAL CARE FIRST HOUR: CPT

## 2018-06-15 PROCEDURE — 93306 TTE W/DOPPLER COMPLETE: CPT | Mod: 26

## 2018-06-15 PROCEDURE — 99223 1ST HOSP IP/OBS HIGH 75: CPT

## 2018-06-15 PROCEDURE — 71045 X-RAY EXAM CHEST 1 VIEW: CPT | Mod: 26

## 2018-06-15 PROCEDURE — 93620 COMP EP EVL R AT VEN PAC&REC: CPT | Mod: 26

## 2018-06-15 RX ORDER — SENNA PLUS 8.6 MG/1
1 TABLET ORAL AT BEDTIME
Qty: 0 | Refills: 0 | Status: DISCONTINUED | OUTPATIENT
Start: 2018-06-15 | End: 2018-06-16

## 2018-06-15 RX ORDER — DOCUSATE SODIUM 100 MG
100 CAPSULE ORAL THREE TIMES A DAY
Qty: 0 | Refills: 0 | Status: DISCONTINUED | OUTPATIENT
Start: 2018-06-15 | End: 2018-06-16

## 2018-06-15 RX ORDER — POTASSIUM CHLORIDE 20 MEQ
20 PACKET (EA) ORAL ONCE
Qty: 0 | Refills: 0 | Status: COMPLETED | OUTPATIENT
Start: 2018-06-15 | End: 2018-06-15

## 2018-06-15 RX ADMIN — Medication 20 MILLIEQUIVALENT(S): at 06:43

## 2018-06-15 RX ADMIN — CLOPIDOGREL BISULFATE 75 MILLIGRAM(S): 75 TABLET, FILM COATED ORAL at 18:35

## 2018-06-15 RX ADMIN — Medication 81 MILLIGRAM(S): at 18:35

## 2018-06-15 RX ADMIN — Medication 100 MILLIGRAM(S): at 06:43

## 2018-06-15 RX ADMIN — HEPARIN SODIUM 5000 UNIT(S): 5000 INJECTION INTRAVENOUS; SUBCUTANEOUS at 06:48

## 2018-06-15 RX ADMIN — SENNA PLUS 1 TABLET(S): 8.6 TABLET ORAL at 22:24

## 2018-06-15 RX ADMIN — Medication 100 MILLIGRAM(S): at 15:07

## 2018-06-15 RX ADMIN — HEPARIN SODIUM 5000 UNIT(S): 5000 INJECTION INTRAVENOUS; SUBCUTANEOUS at 22:24

## 2018-06-15 RX ADMIN — Medication 100 MILLIGRAM(S): at 22:24

## 2018-06-15 NOTE — PHYSICAL THERAPY INITIAL EVALUATION ADULT - PERTINENT HX OF CURRENT PROBLEM, REHAB EVAL
82 year old male with severe aortic stenosis who was referred for further evaluation of his valvular disease.

## 2018-06-15 NOTE — PHYSICAL THERAPY INITIAL EVALUATION ADULT - GAIT DISTANCE, PT EVAL
150 feet with portable monitor with close supervision, steady, no loss of balance; 25 feet without assistive device with contact guard, slightly unsteady, no loss of balance

## 2018-06-15 NOTE — CONSULT NOTE ADULT - ASSESSMENT
83 y/o male h/o HTN, history of prostate cancer s/p radiation 10 years ago, anemia (baseline Hgb unknown), Hepatitis B (treated 9 years ago), left hip arthritis, chronic diastolic heart failure with severe AS, who is now s/p TAVR (Corevalve) on 6/14/18.  No history of syncope or near-syncope, CP or palpitations. Cath was non-obstructive CAD on 6/5/18. His Aortic stenosis was noted during pre-op clearance by Dr. Wagner for elective hip surgery.  His pre-TAVR EKG showed NSR first degree AVB (AR 210ms) with QRS 82 ms.  Post-TAVR EKG is consistent with NSR first degree AVB and  ms.  There is no pause/ CHB on telemetry post TAVR so far.  No TVP.    - NPO today for further decision on EPS and possible PPM if abnormal study.

## 2018-06-15 NOTE — CONSULT NOTE ADULT - SUBJECTIVE AND OBJECTIVE BOX
HISTORY OF PRESENT ILLNESS:   81 y/o male h/o HTN, history of prostate cancer s/p radiation 10 years ago, anemia (baseline Hgb unknown), Hepatitis B (treated 9 years ago), left hip arthritis, chronic diastolic heart failure with severe AS, who is now s/p TAVR (Corevalve) on 18.  No history of syncope or near-syncope, CP or palpitations. Cath was non-obstructive CAD on 18. His Aortic stenosis was noted during pre-op clearance by Dr. Wagner for elective hip surgery.  His pre-TAVR EKG showed NSR first degree AVB (MD 210ms) with QRS 82 ms.  Post-TAVR EKG is consistent with NSR first degree AVB and  ms.  There is no pause/ CHB on telemetry post TAVR so far.  No TVP.        PAST MEDICAL AND SURGICAL HISTORY:  GERD (gastroesophageal reflux disease)  Prostate cancer: radiation treatment  Lumbar herniated disc  Hepatitis: B  OA (osteoarthritis)  Aortic stenosis  Hypertension  Surgery, elective: Shoulder surgery  S/P tonsillectomy  S/P appendectomy      FAMILY HISTORY: n/a    SOCIAL HISTORY:   Alcohol use, former smoker, quit 4 months ago    Review of Systems: MSK: joint pain  Constituional, Eyes, ENT, Cardiovascular, Respiratory, GI, , Integumentary, Neuro, Psych, Endocrine, and Heme/Lymph are otherwise negative    ALLERGY:  No Known Allergies    INPATIENT MEDICATIONS:  acetaminophen   Tablet. 650 milliGRAM(s) Oral every 6 hours PRN  aspirin  chewable 81 milliGRAM(s) Oral daily  ceFAZolin   IVPB 2000 milliGRAM(s) IV Intermittent every 8 hours  clopidogrel Tablet 75 milliGRAM(s) Oral daily  heparin  Injectable 5000 Unit(s) SubCutaneous every 8 hours  pantoprazole    Tablet 40 milliGRAM(s) Oral before breakfast  sodium chloride 0.9%. 1000 milliLiter(s) IV Continuous <Continuous>      VITAL SIGNS:   T(C): 36.2 (06-15-18 @ 10:21), Max: 37.1 (06-15-18 @ 01:00)  HR: 56 (06-15-18 @ 09:00) (50 - 72)  BP: 126 / 46  RR: 14 (06-15-18 @ 09:00) (5 - 21)  SpO2: 96% (06-15-18 @ 09:00) (93% - 100%)    PHYSICAL EXAM:   Appearance: NAD  CVS: S1/S2 normal, RRR, soft murmur 2/6  Pulm: CTA bilateral. No wheeze or rales  Abd:  +BS, Soft, NT/ND	  Ext: No LE edema  NEuro: AAO x 3. No focal deficit    LABS:                        10.2   7.4   )-----------( 210      ( 15 Rishi 2018 03:43 )             30.4     06-15    132<L>  |  94<L>  |  21  ----------------------------<  100<H>  3.9   |  25  |  1.01    Ca    9.0      15 Rishi 2018 03:43  Phos  4.8     06-15  Mg     2.6     -15    TPro  6.1  /  Alb  3.8  /  TBili  0.5  /  DBili  x   /  AST  23  /  ALT  14  /  AlkPhos  34<L>  15    PT/INR - ( 15 Rishi 2018 03:43 )   PT: 10.6 sec;   INR: 0.96          PTT - ( 15 Rishi 2018 03:43 )  PTT:28.7 sec     LIVER FUNCTIONS - ( 15 Rishi 2018 03:43 )  Alb: 3.8 g/dL / Pro: 6.1 g/dL / ALK PHOS: 34 U/L / ALT: 14 U/L / AST: 23 U/L / GGT: x             EK18 (11AM -- pre TAVR): NSR 63 bpm. first degree AVB  ms.  IVCD QRS 82 ms.  Normal QTC   EKG 6/15/18 (4AM): NSR 61 bpm.  ms.  ms. LBBB.     Telemetry: NSR HR 70s bpm. No pause / CHB or arrhythmia

## 2018-06-16 ENCOUNTER — TRANSCRIPTION ENCOUNTER (OUTPATIENT)
Age: 83
End: 2018-06-16

## 2018-06-16 VITALS — TEMPERATURE: 99 F

## 2018-06-16 LAB
ANION GAP SERPL CALC-SCNC: 11 MMOL/L — SIGNIFICANT CHANGE UP (ref 5–17)
BUN SERPL-MCNC: 18 MG/DL — SIGNIFICANT CHANGE UP (ref 7–23)
CALCIUM SERPL-MCNC: 9 MG/DL — SIGNIFICANT CHANGE UP (ref 8.4–10.5)
CHLORIDE SERPL-SCNC: 95 MMOL/L — LOW (ref 96–108)
CO2 SERPL-SCNC: 26 MMOL/L — SIGNIFICANT CHANGE UP (ref 22–31)
CREAT SERPL-MCNC: 0.92 MG/DL — SIGNIFICANT CHANGE UP (ref 0.5–1.3)
GLUCOSE SERPL-MCNC: 101 MG/DL — HIGH (ref 70–99)
HCT VFR BLD CALC: 29.8 % — LOW (ref 39–50)
HGB BLD-MCNC: 10.2 G/DL — LOW (ref 13–17)
MAGNESIUM SERPL-MCNC: 2 MG/DL — SIGNIFICANT CHANGE UP (ref 1.6–2.6)
MCHC RBC-ENTMCNC: 31.3 PG — SIGNIFICANT CHANGE UP (ref 27–34)
MCHC RBC-ENTMCNC: 34.2 G/DL — SIGNIFICANT CHANGE UP (ref 32–36)
MCV RBC AUTO: 91.4 FL — SIGNIFICANT CHANGE UP (ref 80–100)
PHOSPHATE SERPL-MCNC: 3.9 MG/DL — SIGNIFICANT CHANGE UP (ref 2.5–4.5)
PLATELET # BLD AUTO: 181 K/UL — SIGNIFICANT CHANGE UP (ref 150–400)
POTASSIUM SERPL-MCNC: 4.2 MMOL/L — SIGNIFICANT CHANGE UP (ref 3.5–5.3)
POTASSIUM SERPL-SCNC: 4.2 MMOL/L — SIGNIFICANT CHANGE UP (ref 3.5–5.3)
RBC # BLD: 3.26 M/UL — LOW (ref 4.2–5.8)
RBC # FLD: 12.9 % — SIGNIFICANT CHANGE UP (ref 10.3–16.9)
SODIUM SERPL-SCNC: 132 MMOL/L — LOW (ref 135–145)
WBC # BLD: 7 K/UL — SIGNIFICANT CHANGE UP (ref 3.8–10.5)
WBC # FLD AUTO: 7 K/UL — SIGNIFICANT CHANGE UP (ref 3.8–10.5)

## 2018-06-16 PROCEDURE — 71045 X-RAY EXAM CHEST 1 VIEW: CPT | Mod: 26

## 2018-06-16 RX ORDER — ACETAMINOPHEN 500 MG
2 TABLET ORAL
Qty: 240 | Refills: 0
Start: 2018-06-16 | End: 2018-07-15

## 2018-06-16 RX ORDER — OMEPRAZOLE 10 MG/1
1 CAPSULE, DELAYED RELEASE ORAL
Qty: 0 | Refills: 0 | COMMUNITY

## 2018-06-16 RX ORDER — OMEPRAZOLE 10 MG/1
1 CAPSULE, DELAYED RELEASE ORAL
Qty: 30 | Refills: 0
Start: 2018-06-16 | End: 2018-07-15

## 2018-06-16 RX ORDER — ASPIRIN/CALCIUM CARB/MAGNESIUM 324 MG
1 TABLET ORAL
Qty: 30 | Refills: 0
Start: 2018-06-16 | End: 2018-07-15

## 2018-06-16 RX ORDER — SENNA PLUS 8.6 MG/1
1 TABLET ORAL
Qty: 7 | Refills: 0
Start: 2018-06-16 | End: 2018-06-22

## 2018-06-16 RX ORDER — ASPIRIN/CALCIUM CARB/MAGNESIUM 324 MG
1 TABLET ORAL
Qty: 0 | Refills: 0 | COMMUNITY

## 2018-06-16 RX ORDER — HYDROCHLOROTHIAZIDE 25 MG
25 TABLET ORAL DAILY
Qty: 0 | Refills: 0 | Status: DISCONTINUED | OUTPATIENT
Start: 2018-06-16 | End: 2018-06-16

## 2018-06-16 RX ORDER — CLOPIDOGREL BISULFATE 75 MG/1
1 TABLET, FILM COATED ORAL
Qty: 30 | Refills: 0
Start: 2018-06-16 | End: 2018-07-15

## 2018-06-16 RX ORDER — DOCUSATE SODIUM 100 MG
1 CAPSULE ORAL
Qty: 21 | Refills: 0
Start: 2018-06-16 | End: 2018-06-22

## 2018-06-16 RX ADMIN — CLOPIDOGREL BISULFATE 75 MILLIGRAM(S): 75 TABLET, FILM COATED ORAL at 11:57

## 2018-06-16 RX ADMIN — Medication 100 MILLIGRAM(S): at 06:54

## 2018-06-16 RX ADMIN — Medication 25 MILLIGRAM(S): at 11:56

## 2018-06-16 RX ADMIN — PANTOPRAZOLE SODIUM 40 MILLIGRAM(S): 20 TABLET, DELAYED RELEASE ORAL at 06:54

## 2018-06-16 RX ADMIN — HEPARIN SODIUM 5000 UNIT(S): 5000 INJECTION INTRAVENOUS; SUBCUTANEOUS at 06:54

## 2018-06-16 RX ADMIN — Medication 81 MILLIGRAM(S): at 11:56

## 2018-06-16 NOTE — PROGRESS NOTE ADULT - SUBJECTIVE AND OBJECTIVE BOX
CTICU  CRITICAL  CARE  attending     Hand off received 					   Pertinent clinical, laboratory, radiographic, hemodynamic, echocardiographic, respiratory data, microbiologic data and chart were reviewed and analyzed frequently throughout the course of the day and night  Patient seen and examined with CTS/ SH attending at bedside  Pt is a 82y , Male, HEALTH ISSUES - PROBLEM Dx:      , FAMILY HISTORY:  PAST MEDICAL & SURGICAL HISTORY:  GERD (gastroesophageal reflux disease)  Prostate cancer: radiation treatment  Lumbar herniated disc  Hepatitis: B  OA (osteoarthritis)  Aortic stenosis  Hypertension  Surgery, elective: Shoulder surgery  S/P tonsillectomy  S/P appendectomy    Patient is a 82y old  Male who presents with a chief complaint of Aortic Stenosis (14 Jun 2018 10:14)      14 system review was unremarkable  acute changes include acute respiratory failure  Vital signs, hemodynamic and respiratory parameters were reviewed from the bedside nursing flowsheet.  ICU Vital Signs Last 24 Hrs  T(C): 36.3 (14 Jun 2018 16:47), Max: 36.3 (14 Jun 2018 16:47)  T(F): 97.4 (14 Jun 2018 16:47), Max: 97.4 (14 Jun 2018 16:47)  HR: 58 (14 Jun 2018 20:00) (50 - 66)  BP: --  BP(mean): --  ABP: 158/56 (14 Jun 2018 20:00) (136/52 - 172/68)  ABP(mean): 88 (14 Jun 2018 20:00) (78 - 108)  RR: 18 (14 Jun 2018 20:00) (5 - 21)  SpO2: 100% (14 Jun 2018 20:00) (93% - 100%)    Adult Advanced Hemodynamics Last 24 Hrs  CVP(mm Hg): --  CVP(cm H2O): --  CO: --  CI: --  PA: --  PA(mean): --  PCWP: --  SVR: --  SVRI: --  PVR: --  PVRI: --, ABG - ( 14 Jun 2018 17:44 )  pH, Arterial: 7.46  pH, Blood: x     /  pCO2: 34    /  pO2: 116   / HCO3: 23    / Base Excess: -0.3  /  SaO2: 98                  Intake and output was reviewed and the fluid balance was calculated  Daily     Daily   I&O's Summary    14 Jun 2018 07:01  -  14 Jun 2018 20:32  --------------------------------------------------------  IN: 170 mL / OUT: 200 mL / NET: -30 mL        All lines and drain sites were assessed  Glycemic trend was reviewedCAPILLARY BLOOD GLUCOSE        No acute change in mental status  Auscultation of the chest reveals equal bs  Abdomen is soft  Extremities are warm and well perfused  Wounds appear clean and unremarkable  Antibiotics are periop    labs  CBC Full  -  ( 14 Jun 2018 17:51 )  WBC Count : 8.3 K/uL  Hemoglobin : 10.9 g/dL  Hematocrit : 33.0 %  Platelet Count - Automated : 216 K/uL  Mean Cell Volume : 91.7 fL  Mean Cell Hemoglobin : 30.3 pg  Mean Cell Hemoglobin Concentration : 33.0 g/dL  Auto Neutrophil # : x  Auto Lymphocyte # : x  Auto Monocyte # : x  Auto Eosinophil # : x  Auto Basophil # : x  Auto Neutrophil % : x  Auto Lymphocyte % : x  Auto Monocyte % : x  Auto Eosinophil % : x  Auto Basophil % : x    06-14    132<L>  |  94<L>  |  15  ----------------------------<  97  4.0   |  25  |  0.84    Ca    9.5      14 Jun 2018 17:52  Phos  4.2     06-14  Mg     1.8     06-14    TPro  6.5  /  Alb  4.2  /  TBili  0.7  /  DBili  x   /  AST  26  /  ALT  16  /  AlkPhos  36<L>  06-14    PT/INR - ( 14 Jun 2018 17:51 )   PT: 11.0 sec;   INR: 0.99          PTT - ( 14 Jun 2018 17:51 )  PTT:33.3 sec  The current medications were reviewed   MEDICATIONS  (STANDING):  aspirin  chewable 81 milliGRAM(s) Oral daily  ceFAZolin   IVPB 2000 milliGRAM(s) IV Intermittent every 8 hours  celecoxib 200 milliGRAM(s) Oral two times a day with meals  heparin  Injectable 5000 Unit(s) SubCutaneous every 8 hours  pantoprazole    Tablet 40 milliGRAM(s) Oral before breakfast  sodium chloride 0.9%. 1000 milliLiter(s) (10 mL/Hr) IV Continuous <Continuous>    MEDICATIONS  (PRN):  acetaminophen   Tablet. 650 milliGRAM(s) Oral every 6 hours PRN Moderate Pain (4 - 6)       PROBLEM LIST/ ASSESSMENT:  HEALTH ISSUES - PROBLEM Dx:      ,   Patient is a 82y old  Male who presents with a chief complaint of Aortic Stenosis (14 Jun 2018 10:14)     s/p tavr      My plan includes :  close hemodynamic, ventilatory and drain monitoring and management per post op routine    Monitor for arrhythmias and monitor parameters for organ perfusion  monitor neurologic status  Head of the bed should remain elevated to 45 deg .   chest PT and IS will be encouraged  monitor adequacy of oxygenation and ventilation and attempt to wean oxygen  Nutritional goals will be met using po eventually , ensure adequate caloric intake and montior the same  Stress ulcer and VTE prophylaxis will be achieved    Glycemic control is satisfactory  Electrolytes have been repleted as necessary and wound care has been carried out. Pain control has been achieved.   agressive physical therapy and early mobility and ambulation goals will be met   The family was updated about the course and plan  CRITICAL CARE TIME SPENT in evaluation and management, reassessments, review and interpretation of labs and x-rays, ventilator and hemodynamic management, formulating a plan and coordinating care: ___90____ MIN.  Time does not include procedural time.  CTICU ATTENDING     					    Yousif Ortiz MD
Brief note, full note to follow    Comprehensive electrophysiologic study performed via right femoral vein. Access obtained under fluoroscopic guidance.    Baseline rhythm is sinus with first degree AV block and a left bundle branch block  Baseline A-H is prolonged at 130 msec.  Baseline H-V interval is mildly prolonged at 55-60 msec  Antegrade AV papo Wenckebach occurs at an atrial pacing rate of 145 bpm  Retrograde conduction is intact and decremental consistent with the absence of any concealed bypass tract.    Sheaths removed and hemostasis obtained by manual pressure.    By the [facility] TAVR Guidelines, there is no acute indication for permanent pacing.    Procedure tolerated well, no complications.
CTICU  CRITICAL  CARE  attending     Hand off received @ 7a					   Pertinent clinical, laboratory, radiographic, hemodynamic, echocardiographic, respiratory data, microbiologic data and chart were reviewed and analyzed frequently throughout the course of the day and night  Patient seen and examined with CTS/ SH attending at bedside    Pt is a 82y , Male, day # 1 s/p TF-TAVR with a core valve    post procedure:    IVCD/bundle branch block      , FAMILY HISTORY:  PAST MEDICAL & SURGICAL HISTORY:  GERD (gastroesophageal reflux disease)  Prostate cancer: radiation treatment  Lumbar herniated disc  Hepatitis: B  OA (osteoarthritis)  Aortic stenosis  Hypertension  Surgery, elective: Shoulder surgery  S/P tonsillectomy  S/P appendectomy    Patient is a 82y old  Male who presents with a chief complaint of Aortic Stenosis (14 Jun 2018 10:14)      14 system review was unremarkable  acute changes include acute respiratory failure  Vital signs, hemodynamic and respiratory parameters were reviewed from the bedside nursing flowsheet.  ICU Vital Signs Last 24 Hrs  T(C): 36.2 (15 Rishi 2018 10:21), Max: 37.1 (15 Rishi 2018 01:00)  T(F): 97.1 (15 Rishi 2018 10:21), Max: 98.8 (15 Rishi 2018 01:00)  HR: 62 (15 Rishi 2018 12:00) (50 - 72)  BP: --  BP(mean): --  ABP: 152/54 (15 Rishi 2018 12:00) (116/42 - 172/68)  ABP(mean): 88 (15 Rishi 2018 12:00) (66 - 108)  RR: 17 (15 Rishi 2018 12:00) (5 - 21)  SpO2: 99% (15 Rishi 2018 12:00) (93% - 100%)    Adult Advanced Hemodynamics Last 24 Hrs  CVP(mm Hg): --  CVP(cm H2O): --  CO: --  CI: --  PA: --  PA(mean): --  PCWP: --  SVR: --  SVRI: --  PVR: --  PVRI: --, ABG - ( 15 Rishi 2018 03:44 )  pH, Arterial: 7.42  pH, Blood: x     /  pCO2: 40    /  pO2: 99    / HCO3: 25    / Base Excess: 1.0   /  SaO2: 97                  Intake and output was reviewed and the fluid balance was calculated  Daily     Daily   I&O's Summary    14 Jun 2018 07:01  -  15 Rishi 2018 07:00  --------------------------------------------------------  IN: 220 mL / OUT: 800 mL / NET: -580 mL    15 Rishi 2018 07:01  -  15 Rishi 2018 12:58  --------------------------------------------------------  IN: 50 mL / OUT: 370 mL / NET: -320 mL        All lines and drain sites were assessed  Glycemic trend was reviewedCAPILLARY BLOOD GLUCOSE        No acute change in mental status  Auscultation of the chest reveals equal bs  Abdomen is soft  Extremities are warm and well perfused  Wounds appear clean and unremarkable  Antibiotics are periop    labs  CBC Full  -  ( 15 Rishi 2018 03:43 )  WBC Count : 7.4 K/uL  Hemoglobin : 10.2 g/dL  Hematocrit : 30.4 %  Platelet Count - Automated : 210 K/uL  Mean Cell Volume : 91.6 fL  Mean Cell Hemoglobin : 30.7 pg  Mean Cell Hemoglobin Concentration : 33.6 g/dL  Auto Neutrophil # : x  Auto Lymphocyte # : x  Auto Monocyte # : x  Auto Eosinophil # : x  Auto Basophil # : x  Auto Neutrophil % : x  Auto Lymphocyte % : x  Auto Monocyte % : x  Auto Eosinophil % : x  Auto Basophil % : x    06-15    132<L>  |  94<L>  |  21  ----------------------------<  100<H>  3.9   |  25  |  1.01    Ca    9.0      15 Rishi 2018 03:43  Phos  4.8     06-15  Mg     2.6     06-15    TPro  6.1  /  Alb  3.8  /  TBili  0.5  /  DBili  x   /  AST  23  /  ALT  14  /  AlkPhos  34<L>  06-15    PT/INR - ( 15 Rishi 2018 03:43 )   PT: 10.6 sec;   INR: 0.96          PTT - ( 15 Rishi 2018 03:43 )  PTT:28.7 sec  The current medications were reviewed   MEDICATIONS  (STANDING):  aspirin  chewable 81 milliGRAM(s) Oral daily  ceFAZolin   IVPB 2000 milliGRAM(s) IV Intermittent every 8 hours  clopidogrel Tablet 75 milliGRAM(s) Oral daily  heparin  Injectable 5000 Unit(s) SubCutaneous every 8 hours  pantoprazole    Tablet 40 milliGRAM(s) Oral before breakfast  sodium chloride 0.9%. 1000 milliLiter(s) (10 mL/Hr) IV Continuous <Continuous>    MEDICATIONS  (PRN):  acetaminophen   Tablet. 650 milliGRAM(s) Oral every 6 hours PRN Moderate Pain (4 - 6)       PROBLEM LIST/ ASSESSMENT:  HEALTH ISSUES - PROBLEM Dx:    IVCD  Bundle branch block  s/p TAVR      ,   Patient is a 82y old  Male who presents with a chief complaint of Aortic Stenosis (14 Jun 2018 10:14)     s/p TAVR      My plan includes :  close hemodynamic, ventilatory and drain monitoring and management per post op routine    Monitor for arrhythmias and monitor parameters for organ perfusion  monitor neurologic status  Head of the bed should remain elevated to 45 deg .   chest PT and IS will be encouraged  monitor adequacy of oxygenation and ventilation and attempt to wean oxygen  Nutritional goals will be met using po eventually , ensure adequate caloric intake and montior the same  Stress ulcer and VTE prophylaxis will be achieved    Glycemic control is satisfactory  Electrolytes have been repleted as necessary and wound care has been carried out. Pain control has been achieved.   agressive physical therapy and early mobility and ambulation goals will be met   The family was updated about the course and plan  CRITICAL CARE TIME SPENT in evaluation and management, reassessments, review and interpretation of labs and x-rays, ventilator and hemodynamic management, formulating a plan and coordinating care: ___90____ MIN.  Time does not include procedural time.  CTICU ATTENDING     					    Ashok Vasques MD
Patient discussed on morning rounds with Dr. Mujica  Operation / Date:  TAVR (core valve) EF 65% 6/14  Surgeon: Dr. Murphy  Referring Physician: Dr. Michel    SUBJECTIVE ASSESSMENT:  82y Male, seen at the bedside. He is without complaints and is looking forward to being discharged. He denies fever, chills, headache, dizziness, chest pain, palpitations, SOB, abdominal pain, n/v/d, pain or swelling in the upper or lower extremities.    Hospital Course: 83 y/o male with a histroy of hypertension, history of prostate cancer s/p radiation 10 years ago, anemia (baseline Hgb unknown), Hepatitis B (treated 9 years ago), left hip arthritis, chronic diastolic heart failure with severe aortic stenosis who was referred by Dr. Robby Michel for further evaluation of his aortic valve disease. His Aortic stenosis was noted during pre-op clearance by Dr. Wagner for elective hip surgery. He had an Echo on 5/16/18 which showed severe aortic stenosis with worsening gradients, mean gradient of 53 mmHg and peak gradient of 94mmHg. Cath was non-obstructive CAD on 6/5/18. On 6/14/18 the patient presented to [facility] for an elective TAVR with Dr. Murphy. The procedure was uneventful and his TVP was removed. An EKG was performed and a new LBBB was noted. POD 1, he had a TTE that showed trace to mild aortic paravalvular regurg, LOVE 1. 2 cm2/m2, gradient 16mmhg. The patient underwent an EP study with Dr. Barboza, which was determined to be negative. Therefore the patient will have an event monitor sent to his home and will be reevaluated by EP as an outpatient. POD 2, patient doing well and HD stable. His EKG remained stable.  As per, Dr. Lewis he is now medically ready to be discharged home. An event monitor will be brought to the patient's home next week, as confirmed by EP.     Vital Signs Last 24 Hrs  T(C): 37.1 (16 Jun 2018 14:32), Max: 37.1 (16 Jun 2018 14:32)  T(F): 98.8 (16 Jun 2018 14:32), Max: 98.8 (16 Jun 2018 14:32)  HR: 66 (16 Jun 2018 12:00) (62 - 68)  BP: 131/61 (16 Jun 2018 12:00) (113/57 - 131/61)  BP(mean): 88 (16 Jun 2018 12:00) (79 - 88)  RR: 17 (16 Jun 2018 12:00) (16 - 18)  SpO2: 98% (16 Jun 2018 12:00) (97% - 99%)    EPICARDIAL WIRES REMOVED: Yes.  TIE DOWNS REMOVED: Yes.    PHYSICAL EXAM:  General: NAD, lying comfortably in bed  Neurological: A&Ox3, no focal deficits  Cardiovascular:  S1S2, RRR, no m/g/r  Respiratory: CTA b/l, no wheezing/rhonchi/rales  Gastrointestinal: +BS. NT/ND  Extremities: No edema or calf tenderness  Vascular: warm and well perfused  Incision Sites: groins: soft and without hematoma    LABS:                        10.2   7.0   )-----------( 181      ( 16 Jun 2018 06:34 )             29.8       COUMADIN:  No.   PT/INR - ( 15 Rishi 2018 15:43 )   PT: 10.7 sec;   INR: 0.96     PTT - ( 15 Rishi 2018 03:43 )  PTT:28.7 sec    06-16  132<L>  |  95<L>  |  18  ----------------------------<  101<H>  4.2   |  26  |  0.92  Ca    9.0      16 Jun 2018 06:34  Phos  3.9     06-16  Mg     2.0     06-16  TPro  6.1  /  Alb  3.8  /  TBili  0.5  /  DBili  x   /  AST  23  /  ALT  14  /  AlkPhos  34<L>  06-15      MEDICATIONS  (STANDING):  aspirin  chewable 81 milliGRAM(s) Oral daily  clopidogrel Tablet 75 milliGRAM(s) Oral daily  docusate sodium 100 milliGRAM(s) Oral three times a day  heparin  Injectable 5000 Unit(s) SubCutaneous every 8 hours  hydrochlorothiazide 25 milliGRAM(s) Oral daily  pantoprazole    Tablet 40 milliGRAM(s) Oral before breakfast  senna 1 Tablet(s) Oral at bedtime      Discharge CXR:  Xray Chest 1 View- PORTABLE-Routine (06.16.18 @ 07:16)  Portable examination the chest demonstrates the heart to be within normal   limits in transverse diameter. No acute infiltrates. Patient status post   valvular replacement.  Impression: No acute infiltrates        Discharge ECHO:

## 2018-06-16 NOTE — DISCHARGE NOTE ADULT - PLAN OF CARE
To recover from surgery -Please follow up with Dr. Murphy in 1-2 weeks, our office will call you to schedule an appointment.  The office is located at [facility], [facility], 4th floor. Call us with any questions [phone].  -Please follow up with Dr. Barboza. His office will call you to schedule you an appointment. They will also call you to arrange your Event Monitor delivery.  -Please call your cardiologist, Dr. Robby Michel to schedule a follow up appointment for in 2-3 weeks.    -Walk daily as tolerated and use your incentive spirometer every hour.    -No driving or strenuous activity/exercise for 6 weeks, or until  cleared by your surgeon.    -Gently clean your incisions with anti-bacterial soap and water, pat  dry.  You may leave them open to air.    -Call your doctor if you have shortness of breath, chest pain not  relieved by pain medication, dizziness, fever >101.5, or increased  redness or drainage from incisions.

## 2018-06-16 NOTE — DISCHARGE NOTE ADULT - CARE PLAN
Principal Discharge DX:	Aortic stenosis  Goal:	To recover from surgery  Assessment and plan of treatment:	-Please follow up with Dr. Murphy in 1-2 weeks, our office will call you to schedule an appointment.  The office is located at [facility], [facility], 4th floor. Call us with any questions [phone].  -Please follow up with Dr. Barboza. His office will call you to schedule you an appointment. They will also call you to arrange your Event Monitor delivery.  -Please call your cardiologist, Dr. Robby Michel to schedule a follow up appointment for in 2-3 weeks.    -Walk daily as tolerated and use your incentive spirometer every hour.    -No driving or strenuous activity/exercise for 6 weeks, or until  cleared by your surgeon.    -Gently clean your incisions with anti-bacterial soap and water, pat  dry.  You may leave them open to air.    -Call your doctor if you have shortness of breath, chest pain not  relieved by pain medication, dizziness, fever >101.5, or increased  redness or drainage from incisions.

## 2018-06-16 NOTE — DISCHARGE NOTE ADULT - MEDICATION SUMMARY - MEDICATIONS TO TAKE
I will START or STAY ON the medications listed below when I get home from the hospital:    meloxicam 15 mg oral tablet  -- 1 tab(s) by mouth once a day  -- Indication: For Moderate pain    acetaminophen 325 mg oral tablet  -- 2 tab(s) by mouth every 6 hours, As needed, Moderate Pain (4 - 6) MDD:8 tabs  -- Indication: For Mild pain    aspirin 81 mg oral tablet  -- 1 tab(s) by mouth once a day  -- Indication: For so your platelets don't stick together    clopidogrel 75 mg oral tablet  -- 1 tab(s) by mouth once a day  -- Indication: For so your platelets don't stick together    hydroCHLOROthiazide 25 mg oral tablet  -- 1 tab(s) by mouth once a day  -- Indication: For High blood pressure    docusate sodium 100 mg oral capsule  -- 1 cap(s) by mouth 3 times a day  -- Indication: For Stool softener, stop for loose stool    senna oral tablet  -- 1 tab(s) by mouth once a day (at bedtime)  -- Indication: For Stool softener, stop for loose stool    omeprazole 20 mg oral delayed release tablet  -- 1 tab(s) by mouth once a day  -- Indication: For Stomach ulcer prevention

## 2018-06-16 NOTE — DISCHARGE NOTE ADULT - PATIENT PORTAL LINK FT
You can access the [facility] Portal, offered by [facility], by registering with the following website: [email or web address]

## 2018-06-16 NOTE — PROGRESS NOTE ADULT - ASSESSMENT
-Please follow up with Dr. Murphy in 1-2 weeks, our office will call you to schedule an appointment.  The office is located at [facility], [facility], 4th floor. Call us with any questions [phone].    -Please follow up with Dr. Barboza. His office will call you to schedule you an appointment. They will also call you to arrange your Event Monitor delivery.    -Please call your cardiologist, Dr. Robby Michel to schedule a follow up appointment for in 2-3 weeks.

## 2018-06-16 NOTE — DISCHARGE NOTE ADULT - CARE PROVIDERS DIRECT ADDRESSES
[email or web address]

## 2018-06-16 NOTE — DISCHARGE NOTE ADULT - NS AS DC PROVIDER CONTACT Y/N MULTI
[facility]    [address] [place] [id number]    Phone:  [phone]    Fax:  [phone]       Thank You for choosing us for your health care visit. We are glad to serve you and happy to provide you with this summary of your visit. Please help us to ensure we have accurate records. If you find anything that needs to be changed, please let our staff know as soon as possible.          Your Demographic Information     Patient Name Sex Jayro Cano Male 1979       Ethnic Group Patient Race    Not of  or  Origin White      Your Visit Details     Date & Time Provider Department    3/14/2017 3:30 PM Adelaide Cardoza MD [facility]      Your Upcoming Appointment*(Max 10)       8:30 AM CDT   Post-Op Visit with Ryland Reina NP   [facility], [address] ([facility])    [address]  [address] [id number] [phone]             12:30 PM CDT   RED FOLLOW-UP with Adelaide Cardoza MD   [facility] ([facility])    [address] [place] [id number] [phone]            Wednesday May 03, 2017  8:00 AM CDT   Behavioral Health New Patient with Sharifa Givens, PHD   [facility] ([facility])    [address] [place] [id number] [phone]              Your Vitals Were     Smoking Status                   Former Smoker           Medications Prescribed or Re-Ordered Today     busPIRone (BUSPAR) 15 MG tablet    Si.5 tab bid for 1 week, 2 tabs bid    Class: Eprescribe    [facility]: [email or web address] [address] [place] [address] & [address] [place] [address] Ph #: [phone]    hydrOXYzine (ATARAX) 25 MG tablet    Sig - Route: Take 1-2 tablets by mouth 2 times daily as needed for Anxiety. - Oral    Class: Eprescribe    [facility]  [id number]  [facility] [address] [place] [address] & [address] [place] [address] #: [phone]      Your Current Medications Are        Disp Refills Start End    busPIRone (BUSPAR) 15 MG tablet 120 tablet 1 3/14/2017     Si.5 tab bid for 1 week, 2 tabs bid    Class: Eprescribe    gabapentin (NEURONTIN) 600 MG tablet 90 tablet 2 2017     Sig: TAKE 1 TABLET BY MOUTH THREE TIMES DAILY    Class: Eprescribe    furosemide (LASIX) 20 MG tablet 90 tablet 0 2017     Sig - Route: Take 1 tablet by mouth daily. - Oral    Class: Eprescribe    Cosign for Ordering: Accepted by Rodney Gambino MD on 1/10/2017  5:02 PM    minocycline (MINOCIN,DYNACIN) 100 MG capsule 60 capsule 1 2016     Sig - Route: Take 1 capsule by mouth 2 times daily. - Oral    Class: Eprescribe    Blood Pressure Monitoring (BLOOD PRESSURE CUFF) Misc 1 each 0 2016     Sig: Check Blood pressure as needed    Class: Eprescribe    Cosign for Ordering: Accepted by Rodney Gambino MD on 2016 12:21 AM    Testosterone (ANDROGEL) 20.25 MG/1.25GM (1.62%) Gel 2.5 g 2 10/17/2016     Sig - Route: Place 2 pumps onto the skin daily. - Transdermal    Class: Script Not Printed    Notes to Pharmacy: Called into [facility]    Cosign for Ordering: Accepted by Rodney Gambino MD on 10/18/2016  5:15 PM    lisinopril (ZESTRIL) 10 MG tablet 120 tablet 3 10/13/2016     Sig - Route: Take 1 tablet by mouth 2 times daily. - Oral    Class: Eprescribe    Cosign for Ordering: Accepted by Rodney Gambino MD on 10/14/2016  7:25 AM    clindamycin (CLEOCIN-T) 1 % lotion 30 g 5 2016     Sig: Apply to affected area twice daily    Class: Eprescribe    Cosign for Ordering: Accepted by Rodney Gambino MD on 2016  8:02 AM    clindamycin (CLEOCIN-T) 1 % gel 30 g 2 2016     Sig: Apply to affected area twice daily    Class: Eprescribe    Notes to Pharmacy: Please disregard previous script    Cosign for Ordering: Accepted by Rodney Gambino MD on 2016  1:23 PM     citalopram (CELEXA) 20 MG tablet 90 tablet 2 7/8/2016     Sig - Route: Take 1 tablet by mouth every morning. - Oral    Class: Eprescribe    vitamin - therapeutic multivitamins w/minerals (CENTRUM SILVER,THERA-M) Tab 30 tablet 3 7/8/2016     Sig - Route: Take 1 tablet by mouth daily. - Oral    Class: Eprescribe    hydrOXYzine (ATARAX) 25 MG tablet 60 tablet 1 3/14/2017 3/9/2018    Sig - Route: Take 1-2 tablets by mouth 2 times daily as needed for Anxiety. - Oral    Class: Eprescribe    sildenafil (VIAGRA) 50 MG tablet 10 tablet 6 1/9/2017     Sig - Route: Take 1 tablet by mouth as needed for Erectile Dysfunction. - Oral    Class: Eprescribe    Cosign for Ordering: Accepted by Rodney aGmbino MD on 1/10/2017  5:02 PM    albuterol 108 (90 BASE) MCG/ACT inhaler 8.5 g 3 7/8/2016     Sig - Route: Inhale 2 puffs into the lungs every 4 hours as needed for Wheezing. - Inhalation    Class: Eprescribe      Discontinued Medications        Reason for Discontinue    alprazolam (XANAX) 2 MG tablet Patient Discharge      Allergies     No Known Allergies      Immunizations History as of 3/14/2017     Name Date    Hep A/Hep B 1/26/2017, 8/19/2016    Tdap 8/12/2009      Problem List as of 3/14/2017     Alcohol intoxication delirium    Rhabdomyolysis    Thigh numbness    Fall    Alcohol withdrawal syndrome with complication    Anxiety and depression    Elevated liver enzymes    Hypopotassemia    Hepatitis C virus infection    Panic attack    Chronic low back pain with sciatica    Acne vulgaris            Patient Instructions     None       Yes

## 2018-06-16 NOTE — DISCHARGE NOTE ADULT - HOSPITAL COURSE
83 y/o male with a histroy of hypertension, history of prostate cancer s/p radiation 10 years ago, anemia (baseline Hgb unknown), Hepatitis B (treated 9 years ago), left hip arthritis, chronic diastolic heart failure with severe aortic stenosis who was referred by Dr. Robby Michel for further evaluation of his aortic valve disease. His Aortic stenosis was noted during pre-op clearance by Dr. Wagner for elective hip surgery. He had an Echo on 5/16/18 which showed severe aortic stenosis with worsening gradients, mean gradient of 53 mmHg and peak gradient of 94mmHg. Cath was non-obstructive CAD on 6/5/18. On 6/14/18 the patient presented to [facility] for an elective TAVR with Dr. Murphy. The procedure was uneventful and his TVP was removed. An EKG was performed and a new LBBB was noted. POD 1, he had a TTE that showed trace to mild aortic paravalvular regurg, LOVE 1. 2 cm2/m2, gradient 16mmhg. The patient underwent an EP study with Dr. Barboza, which was determined to be negative. Therefore the patient will have an event monitor sent to his home and will be reevaluated by EP as an outpatient. POD 2, patient doing well and HD stable. His EKG remained stable.  As per, Dr. Lewis he is now medically ready to be discharged home. An event monitor will be brought to the patient's home next week, as confirmed by EP.

## 2018-06-16 NOTE — DISCHARGE NOTE ADULT - MEDICATION SUMMARY - MEDICATIONS TO STOP TAKING
I will STOP taking the medications listed below when I get home from the hospital:    lisinopril-hydroCHLOROthiazide 20 mg-25 mg oral tablet  -- 1 tab(s) by mouth once a day

## 2018-06-19 PROBLEM — K21.9 GASTRO-ESOPHAGEAL REFLUX DISEASE WITHOUT ESOPHAGITIS: Chronic | Status: ACTIVE | Noted: 2018-06-13

## 2018-06-19 PROBLEM — M51.26 OTHER INTERVERTEBRAL DISC DISPLACEMENT, LUMBAR REGION: Chronic | Status: ACTIVE | Noted: 2018-06-13

## 2018-06-19 PROBLEM — C61 MALIGNANT NEOPLASM OF PROSTATE: Chronic | Status: ACTIVE | Noted: 2018-06-13

## 2018-06-21 ENCOUNTER — APPOINTMENT (OUTPATIENT)
Dept: ORTHOPEDIC SURGERY | Facility: CLINIC | Age: 83
End: 2018-06-21

## 2018-06-21 PROCEDURE — 86923 COMPATIBILITY TEST ELECTRIC: CPT

## 2018-06-21 PROCEDURE — C1730: CPT

## 2018-06-21 PROCEDURE — 85730 THROMBOPLASTIN TIME PARTIAL: CPT

## 2018-06-21 PROCEDURE — 83735 ASSAY OF MAGNESIUM: CPT

## 2018-06-21 PROCEDURE — 93312 ECHO TRANSESOPHAGEAL: CPT

## 2018-06-21 PROCEDURE — 83880 ASSAY OF NATRIURETIC PEPTIDE: CPT

## 2018-06-21 PROCEDURE — 84295 ASSAY OF SERUM SODIUM: CPT

## 2018-06-21 PROCEDURE — L8699: CPT

## 2018-06-21 PROCEDURE — 80053 COMPREHEN METABOLIC PANEL: CPT

## 2018-06-21 PROCEDURE — 82330 ASSAY OF CALCIUM: CPT

## 2018-06-21 PROCEDURE — 93306 TTE W/DOPPLER COMPLETE: CPT

## 2018-06-21 PROCEDURE — C1889: CPT

## 2018-06-21 PROCEDURE — 93005 ELECTROCARDIOGRAM TRACING: CPT

## 2018-06-21 PROCEDURE — C1894: CPT

## 2018-06-21 PROCEDURE — 85025 COMPLETE CBC W/AUTO DIFF WBC: CPT

## 2018-06-21 PROCEDURE — 85027 COMPLETE CBC AUTOMATED: CPT

## 2018-06-21 PROCEDURE — 80048 BASIC METABOLIC PNL TOTAL CA: CPT

## 2018-06-21 PROCEDURE — 84132 ASSAY OF SERUM POTASSIUM: CPT

## 2018-06-21 PROCEDURE — C1773: CPT

## 2018-06-21 PROCEDURE — C1760: CPT

## 2018-06-21 PROCEDURE — 36415 COLL VENOUS BLD VENIPUNCTURE: CPT

## 2018-06-21 PROCEDURE — C1769: CPT

## 2018-06-21 PROCEDURE — 86900 BLOOD TYPING SEROLOGIC ABO: CPT

## 2018-06-21 PROCEDURE — 82803 BLOOD GASES ANY COMBINATION: CPT

## 2018-06-21 PROCEDURE — 86901 BLOOD TYPING SEROLOGIC RH(D): CPT

## 2018-06-21 PROCEDURE — 83605 ASSAY OF LACTIC ACID: CPT

## 2018-06-21 PROCEDURE — 71045 X-RAY EXAM CHEST 1 VIEW: CPT

## 2018-06-21 PROCEDURE — 97161 PT EVAL LOW COMPLEX 20 MIN: CPT

## 2018-06-21 PROCEDURE — 86850 RBC ANTIBODY SCREEN: CPT

## 2018-06-21 PROCEDURE — C1887: CPT

## 2018-06-21 PROCEDURE — 84100 ASSAY OF PHOSPHORUS: CPT

## 2018-06-21 PROCEDURE — 85610 PROTHROMBIN TIME: CPT

## 2018-06-25 ENCOUNTER — APPOINTMENT (OUTPATIENT)
Dept: HEART AND VASCULAR | Facility: CLINIC | Age: 83
End: 2018-06-25
Payer: MEDICARE

## 2018-06-25 VITALS
OXYGEN SATURATION: 97 % | WEIGHT: 130 LBS | HEART RATE: 73 BPM | RESPIRATION RATE: 14 BRPM | BODY MASS INDEX: 21.66 KG/M2 | HEIGHT: 65 IN | SYSTOLIC BLOOD PRESSURE: 140 MMHG | TEMPERATURE: 97.8 F | DIASTOLIC BLOOD PRESSURE: 75 MMHG

## 2018-06-25 PROCEDURE — 99214 OFFICE O/P EST MOD 30 MIN: CPT

## 2018-06-25 RX ORDER — AMLODIPINE BESYLATE 10 MG/1
10 TABLET ORAL DAILY
Refills: 0 | Status: DISCONTINUED | COMMUNITY
End: 2018-06-25

## 2018-06-26 DIAGNOSIS — D64.9 ANEMIA, UNSPECIFIED: ICD-10-CM

## 2018-06-26 DIAGNOSIS — I44.7 LEFT BUNDLE-BRANCH BLOCK, UNSPECIFIED: ICD-10-CM

## 2018-06-26 DIAGNOSIS — I35.0 NONRHEUMATIC AORTIC (VALVE) STENOSIS: ICD-10-CM

## 2018-06-26 DIAGNOSIS — Z92.3 PERSONAL HISTORY OF IRRADIATION: ICD-10-CM

## 2018-06-26 DIAGNOSIS — Z79.82 LONG TERM (CURRENT) USE OF ASPIRIN: ICD-10-CM

## 2018-06-26 DIAGNOSIS — B19.10 UNSPECIFIED VIRAL HEPATITIS B WITHOUT HEPATIC COMA: ICD-10-CM

## 2018-06-26 DIAGNOSIS — I44.0 ATRIOVENTRICULAR BLOCK, FIRST DEGREE: ICD-10-CM

## 2018-06-26 DIAGNOSIS — I11.0 HYPERTENSIVE HEART DISEASE WITH HEART FAILURE: ICD-10-CM

## 2018-06-26 DIAGNOSIS — I35.1 NONRHEUMATIC AORTIC (VALVE) INSUFFICIENCY: ICD-10-CM

## 2018-06-26 DIAGNOSIS — I50.32 CHRONIC DIASTOLIC (CONGESTIVE) HEART FAILURE: ICD-10-CM

## 2018-06-26 DIAGNOSIS — Z85.46 PERSONAL HISTORY OF MALIGNANT NEOPLASM OF PROSTATE: ICD-10-CM

## 2018-06-28 ENCOUNTER — MEDICATION RENEWAL (OUTPATIENT)
Age: 83
End: 2018-06-28

## 2018-07-02 ENCOUNTER — APPOINTMENT (OUTPATIENT)
Dept: CARDIOTHORACIC SURGERY | Facility: CLINIC | Age: 83
End: 2018-07-02
Payer: MEDICARE

## 2018-07-02 ENCOUNTER — NON-APPOINTMENT (OUTPATIENT)
Age: 83
End: 2018-07-02

## 2018-07-02 VITALS
RESPIRATION RATE: 18 BRPM | DIASTOLIC BLOOD PRESSURE: 79 MMHG | TEMPERATURE: 97.1 F | SYSTOLIC BLOOD PRESSURE: 207 MMHG | OXYGEN SATURATION: 99 % | HEART RATE: 68 BPM

## 2018-07-02 PROCEDURE — 99214 OFFICE O/P EST MOD 30 MIN: CPT

## 2018-07-13 PROBLEM — Z95.2 STATUS POST TRANSCATHETER AORTIC VALVE REPLACEMENT: Status: ACTIVE | Noted: 2018-07-13

## 2018-07-15 ENCOUNTER — FORM ENCOUNTER (OUTPATIENT)
Age: 83
End: 2018-07-15

## 2018-07-16 ENCOUNTER — OUTPATIENT (OUTPATIENT)
Dept: OUTPATIENT SERVICES | Facility: HOSPITAL | Age: 83
LOS: 1 days | End: 2018-07-16
Payer: MEDICARE

## 2018-07-16 ENCOUNTER — APPOINTMENT (OUTPATIENT)
Dept: CARDIOTHORACIC SURGERY | Facility: CLINIC | Age: 83
End: 2018-07-16
Payer: MEDICARE

## 2018-07-16 ENCOUNTER — APPOINTMENT (OUTPATIENT)
Dept: HEART AND VASCULAR | Facility: CLINIC | Age: 83
End: 2018-07-16
Payer: MEDICARE

## 2018-07-16 VITALS
WEIGHT: 124 LBS | DIASTOLIC BLOOD PRESSURE: 63 MMHG | BODY MASS INDEX: 20.66 KG/M2 | SYSTOLIC BLOOD PRESSURE: 143 MMHG | HEIGHT: 65 IN | HEART RATE: 63 BPM

## 2018-07-16 VITALS
RESPIRATION RATE: 18 BRPM | SYSTOLIC BLOOD PRESSURE: 195 MMHG | DIASTOLIC BLOOD PRESSURE: 75 MMHG | HEART RATE: 68 BPM | TEMPERATURE: 96.9 F | BODY MASS INDEX: 20.63 KG/M2 | OXYGEN SATURATION: 99 % | WEIGHT: 124 LBS

## 2018-07-16 DIAGNOSIS — Z41.9 ENCOUNTER FOR PROCEDURE FOR PURPOSES OTHER THAN REMEDYING HEALTH STATE, UNSPECIFIED: Chronic | ICD-10-CM

## 2018-07-16 DIAGNOSIS — I35.9 NONRHEUMATIC AORTIC VALVE DISORDER, UNSPECIFIED: ICD-10-CM

## 2018-07-16 DIAGNOSIS — Z90.49 ACQUIRED ABSENCE OF OTHER SPECIFIED PARTS OF DIGESTIVE TRACT: Chronic | ICD-10-CM

## 2018-07-16 DIAGNOSIS — Z90.89 ACQUIRED ABSENCE OF OTHER ORGANS: Chronic | ICD-10-CM

## 2018-07-16 DIAGNOSIS — I50.32 CHRONIC DIASTOLIC (CONGESTIVE) HEART FAILURE: ICD-10-CM

## 2018-07-16 DIAGNOSIS — Z95.2 PRESENCE OF PROSTHETIC HEART VALVE: ICD-10-CM

## 2018-07-16 LAB
ALBUMIN SERPL ELPH-MCNC: 4.9 G/DL — SIGNIFICANT CHANGE UP (ref 3.3–5)
ALP SERPL-CCNC: 37 U/L — LOW (ref 40–120)
ALT FLD-CCNC: 23 U/L — SIGNIFICANT CHANGE UP (ref 10–45)
ANION GAP SERPL CALC-SCNC: 16 MMOL/L — SIGNIFICANT CHANGE UP (ref 5–17)
AST SERPL-CCNC: 29 U/L — SIGNIFICANT CHANGE UP (ref 10–40)
BASOPHILS NFR BLD AUTO: 0.4 % — SIGNIFICANT CHANGE UP (ref 0–2)
BILIRUB SERPL-MCNC: 0.6 MG/DL — SIGNIFICANT CHANGE UP (ref 0.2–1.2)
BUN SERPL-MCNC: 24 MG/DL — HIGH (ref 7–23)
CALCIUM SERPL-MCNC: 9.9 MG/DL — SIGNIFICANT CHANGE UP (ref 8.4–10.5)
CHLORIDE SERPL-SCNC: 92 MMOL/L — LOW (ref 96–108)
CO2 SERPL-SCNC: 26 MMOL/L — SIGNIFICANT CHANGE UP (ref 22–31)
CREAT SERPL-MCNC: 1.01 MG/DL — SIGNIFICANT CHANGE UP (ref 0.5–1.3)
EOSINOPHIL NFR BLD AUTO: 1.1 % — SIGNIFICANT CHANGE UP (ref 0–6)
GLUCOSE SERPL-MCNC: 102 MG/DL — HIGH (ref 70–99)
HCT VFR BLD CALC: 34.4 % — LOW (ref 39–50)
HGB BLD-MCNC: 11.7 G/DL — LOW (ref 13–17)
LYMPHOCYTES # BLD AUTO: 13.3 % — SIGNIFICANT CHANGE UP (ref 13–44)
MCHC RBC-ENTMCNC: 31.6 PG — SIGNIFICANT CHANGE UP (ref 27–34)
MCHC RBC-ENTMCNC: 34 G/DL — SIGNIFICANT CHANGE UP (ref 32–36)
MCV RBC AUTO: 93 FL — SIGNIFICANT CHANGE UP (ref 80–100)
MONOCYTES NFR BLD AUTO: 7.4 % — SIGNIFICANT CHANGE UP (ref 2–14)
NEUTROPHILS NFR BLD AUTO: 77.8 % — HIGH (ref 43–77)
PLATELET # BLD AUTO: 283 K/UL — SIGNIFICANT CHANGE UP (ref 150–400)
POTASSIUM SERPL-MCNC: 4.6 MMOL/L — SIGNIFICANT CHANGE UP (ref 3.5–5.3)
POTASSIUM SERPL-SCNC: 4.6 MMOL/L — SIGNIFICANT CHANGE UP (ref 3.5–5.3)
PROT SERPL-MCNC: 7.8 G/DL — SIGNIFICANT CHANGE UP (ref 6–8.3)
RBC # BLD: 3.7 M/UL — LOW (ref 4.2–5.8)
RBC # FLD: 13.4 % — SIGNIFICANT CHANGE UP (ref 10.3–16.9)
SODIUM SERPL-SCNC: 134 MMOL/L — LOW (ref 135–145)
WBC # BLD: 5.3 K/UL — SIGNIFICANT CHANGE UP (ref 3.8–10.5)
WBC # FLD AUTO: 5.3 K/UL — SIGNIFICANT CHANGE UP (ref 3.8–10.5)

## 2018-07-16 PROCEDURE — 80053 COMPREHEN METABOLIC PANEL: CPT

## 2018-07-16 PROCEDURE — 93306 TTE W/DOPPLER COMPLETE: CPT

## 2018-07-16 PROCEDURE — 93306 TTE W/DOPPLER COMPLETE: CPT | Mod: 26

## 2018-07-16 PROCEDURE — 99214 OFFICE O/P EST MOD 30 MIN: CPT

## 2018-07-16 PROCEDURE — 85025 COMPLETE CBC W/AUTO DIFF WBC: CPT

## 2018-07-16 PROCEDURE — 36415 COLL VENOUS BLD VENIPUNCTURE: CPT

## 2018-07-16 PROCEDURE — 93010 ELECTROCARDIOGRAM REPORT: CPT

## 2018-07-16 PROCEDURE — 93005 ELECTROCARDIOGRAM TRACING: CPT

## 2018-07-16 PROCEDURE — 99024 POSTOP FOLLOW-UP VISIT: CPT

## 2018-07-16 PROCEDURE — 99215 OFFICE O/P EST HI 40 MIN: CPT

## 2018-07-16 PROCEDURE — 93000 ELECTROCARDIOGRAM COMPLETE: CPT

## 2018-07-16 RX ORDER — METOPROLOL SUCCINATE 25 MG/1
25 TABLET, EXTENDED RELEASE ORAL DAILY
Qty: 1 | Refills: 3 | Status: ACTIVE | COMMUNITY
Start: 2018-07-16 | End: 1900-01-01

## 2018-07-19 PROBLEM — I50.32 CHRONIC DIASTOLIC CONGESTIVE HEART FAILURE: Status: ACTIVE | Noted: 2018-07-19

## 2018-08-20 ENCOUNTER — APPOINTMENT (OUTPATIENT)
Dept: HEART AND VASCULAR | Facility: CLINIC | Age: 83
End: 2018-08-20
Payer: MEDICARE

## 2018-08-20 VITALS
DIASTOLIC BLOOD PRESSURE: 80 MMHG | WEIGHT: 124 LBS | HEART RATE: 55 BPM | SYSTOLIC BLOOD PRESSURE: 118 MMHG | TEMPERATURE: 96.7 F | OXYGEN SATURATION: 100 % | BODY MASS INDEX: 20.66 KG/M2 | RESPIRATION RATE: 14 BRPM | HEIGHT: 65 IN

## 2018-08-20 PROCEDURE — 99214 OFFICE O/P EST MOD 30 MIN: CPT

## 2018-08-23 ENCOUNTER — APPOINTMENT (OUTPATIENT)
Dept: HEART AND VASCULAR | Facility: CLINIC | Age: 83
End: 2018-08-23
Payer: MEDICARE

## 2018-08-23 VITALS
HEIGHT: 65 IN | SYSTOLIC BLOOD PRESSURE: 130 MMHG | HEART RATE: 57 BPM | WEIGHT: 125 LBS | DIASTOLIC BLOOD PRESSURE: 63 MMHG | BODY MASS INDEX: 20.83 KG/M2

## 2018-08-23 PROCEDURE — 99215 OFFICE O/P EST HI 40 MIN: CPT | Mod: 25

## 2018-08-23 PROCEDURE — 93000 ELECTROCARDIOGRAM COMPLETE: CPT

## 2018-08-23 RX ORDER — PETROLATUM,WHITE/LANOLIN
OINTMENT (GRAM) TOPICAL
Refills: 0 | Status: DISCONTINUED | COMMUNITY
End: 2018-08-23

## 2018-10-23 ENCOUNTER — APPOINTMENT (OUTPATIENT)
Dept: HEART AND VASCULAR | Facility: CLINIC | Age: 83
End: 2018-10-23

## 2019-01-29 ENCOUNTER — FORM ENCOUNTER (OUTPATIENT)
Age: 84
End: 2019-01-29

## 2019-02-05 ENCOUNTER — APPOINTMENT (OUTPATIENT)
Dept: HEART AND VASCULAR | Facility: CLINIC | Age: 84
End: 2019-02-05
Payer: MEDICARE

## 2019-02-05 VITALS
DIASTOLIC BLOOD PRESSURE: 65 MMHG | HEIGHT: 65 IN | OXYGEN SATURATION: 99 % | RESPIRATION RATE: 14 BRPM | SYSTOLIC BLOOD PRESSURE: 125 MMHG | HEART RATE: 65 BPM | WEIGHT: 125 LBS | BODY MASS INDEX: 20.83 KG/M2

## 2019-02-05 PROCEDURE — 93000 ELECTROCARDIOGRAM COMPLETE: CPT

## 2019-02-05 PROCEDURE — 99214 OFFICE O/P EST MOD 30 MIN: CPT

## 2019-02-05 NOTE — HISTORY OF PRESENT ILLNESS
[FreeTextEntry1] : Feels well-post TAVR \par also following w EP- had PAFib\par he is asymptomatic- not lightheaded, no syncope\par off plavix on NOAC- no bleeding/bruising\par bp running normal

## 2019-02-05 NOTE — DISCUSSION/SUMMARY
[___ Month(s)] : [unfilled] month(s) [With Me] : with me [FreeTextEntry3] : echo [FreeTextEntry1] : in nsr\par no bleeding/bruising on NOAC- ok to stop asa\par bp well controlled

## 2019-02-05 NOTE — PHYSICAL EXAM
[General Appearance - Well Developed] : well developed [Normal Appearance] : normal appearance [Well Groomed] : well groomed [General Appearance - Well Nourished] : well nourished [No Deformities] : no deformities [General Appearance - In No Acute Distress] : no acute distress [Normal Conjunctiva] : the conjunctiva exhibited no abnormalities [Eyelids - No Xanthelasma] : the eyelids demonstrated no xanthelasmas [Normal Oral Mucosa] : normal oral mucosa [No Oral Pallor] : no oral pallor [No Oral Cyanosis] : no oral cyanosis [Normal Jugular Venous A Waves Present] : normal jugular venous A waves present [Normal Jugular Venous V Waves Present] : normal jugular venous V waves present [No Jugular Venous Espinosa A Waves] : no jugular venous espinosa A waves [Respiration, Rhythm And Depth] : normal respiratory rhythm and effort [Exaggerated Use Of Accessory Muscles For Inspiration] : no accessory muscle use [Auscultation Breath Sounds / Voice Sounds] : lungs were clear to auscultation bilaterally [Abdomen Soft] : soft [Abdomen Tenderness] : non-tender [Abdomen Mass (___ Cm)] : no abdominal mass palpated [Abnormal Walk] : normal gait [Gait - Sufficient For Exercise Testing] : the gait was sufficient for exercise testing [Nail Clubbing] : no clubbing of the fingernails [Cyanosis, Localized] : no localized cyanosis [Petechial Hemorrhages (___cm)] : no petechial hemorrhages [Skin Color & Pigmentation] : normal skin color and pigmentation [] : no rash [No Venous Stasis] : no venous stasis [Skin Lesions] : no skin lesions [No Skin Ulcers] : no skin ulcer [No Xanthoma] : no  xanthoma was observed [Oriented To Time, Place, And Person] : oriented to person, place, and time [Affect] : the affect was normal [Mood] : the mood was normal [No Anxiety] : not feeling anxious [FreeTextEntry1] : 2/6 laura

## 2019-06-04 ENCOUNTER — APPOINTMENT (OUTPATIENT)
Dept: HEART AND VASCULAR | Facility: CLINIC | Age: 84
End: 2019-06-04
Payer: MEDICARE

## 2019-06-04 VITALS
DIASTOLIC BLOOD PRESSURE: 72 MMHG | RESPIRATION RATE: 14 BRPM | HEIGHT: 65 IN | OXYGEN SATURATION: 99 % | TEMPERATURE: 96.3 F | SYSTOLIC BLOOD PRESSURE: 118 MMHG | HEART RATE: 61 BPM | WEIGHT: 126 LBS | BODY MASS INDEX: 20.99 KG/M2

## 2019-06-04 PROCEDURE — 99214 OFFICE O/P EST MOD 30 MIN: CPT

## 2019-06-04 PROCEDURE — 93000 ELECTROCARDIOGRAM COMPLETE: CPT

## 2019-06-04 RX ORDER — AMLODIPINE BESYLATE 5 MG/1
5 TABLET ORAL DAILY
Qty: 90 | Refills: 0 | Status: DISCONTINUED | COMMUNITY
Start: 2018-07-16 | End: 2019-06-04

## 2019-06-04 NOTE — PHYSICAL EXAM
[General Appearance - Well Developed] : well developed [Normal Appearance] : normal appearance [General Appearance - Well Nourished] : well nourished [Well Groomed] : well groomed [No Deformities] : no deformities [Eyelids - No Xanthelasma] : the eyelids demonstrated no xanthelasmas [Normal Conjunctiva] : the conjunctiva exhibited no abnormalities [General Appearance - In No Acute Distress] : no acute distress [Normal Oral Mucosa] : normal oral mucosa [No Oral Pallor] : no oral pallor [No Oral Cyanosis] : no oral cyanosis [Normal Jugular Venous V Waves Present] : normal jugular venous V waves present [Normal Jugular Venous A Waves Present] : normal jugular venous A waves present [Respiration, Rhythm And Depth] : normal respiratory rhythm and effort [No Jugular Venous Espinosa A Waves] : no jugular venous espinosa A waves [Auscultation Breath Sounds / Voice Sounds] : lungs were clear to auscultation bilaterally [Exaggerated Use Of Accessory Muscles For Inspiration] : no accessory muscle use [Abdomen Soft] : soft [Abdomen Tenderness] : non-tender [Abdomen Mass (___ Cm)] : no abdominal mass palpated [Gait - Sufficient For Exercise Testing] : the gait was sufficient for exercise testing [Abnormal Walk] : normal gait [Nail Clubbing] : no clubbing of the fingernails [Cyanosis, Localized] : no localized cyanosis [Petechial Hemorrhages (___cm)] : no petechial hemorrhages [No Venous Stasis] : no venous stasis [] : no rash [Skin Color & Pigmentation] : normal skin color and pigmentation [No Xanthoma] : no  xanthoma was observed [Skin Lesions] : no skin lesions [No Skin Ulcers] : no skin ulcer [Affect] : the affect was normal [Oriented To Time, Place, And Person] : oriented to person, place, and time [Mood] : the mood was normal [No Anxiety] : not feeling anxious [FreeTextEntry1] : 2/6 laura

## 2019-06-04 NOTE — DISCUSSION/SUMMARY
[___ Month(s)] : [unfilled] month(s) [With Me] : with me [FreeTextEntry3] : holter [FreeTextEntry1] : stable exam\par BP good\par holter next appt\par seeing structural on monday w echo

## 2019-06-04 NOTE — HISTORY OF PRESENT ILLNESS
[FreeTextEntry1] : Feels well\par also following w EP- had PAFib\par he is asymptomatic- not lightheaded, no syncope\par off plavix on NOAC- no bleeding/bruising\par bp running normal-amlodapine was stopped due to episodes of low BP\par no cp or syncope

## 2019-06-05 ENCOUNTER — RESULT CHARGE (OUTPATIENT)
Age: 84
End: 2019-06-05

## 2019-06-09 ENCOUNTER — FORM ENCOUNTER (OUTPATIENT)
Age: 84
End: 2019-06-09

## 2019-06-10 ENCOUNTER — OUTPATIENT (OUTPATIENT)
Dept: OUTPATIENT SERVICES | Facility: HOSPITAL | Age: 84
LOS: 1 days | End: 2019-06-10
Payer: MEDICARE

## 2019-06-10 ENCOUNTER — APPOINTMENT (OUTPATIENT)
Dept: CARDIOTHORACIC SURGERY | Facility: CLINIC | Age: 84
End: 2019-06-10
Payer: MEDICARE

## 2019-06-10 VITALS
DIASTOLIC BLOOD PRESSURE: 77 MMHG | WEIGHT: 126 LBS | HEIGHT: 65 IN | TEMPERATURE: 97.3 F | OXYGEN SATURATION: 97 % | SYSTOLIC BLOOD PRESSURE: 183 MMHG | RESPIRATION RATE: 19 BRPM | BODY MASS INDEX: 20.99 KG/M2 | HEART RATE: 63 BPM

## 2019-06-10 DIAGNOSIS — Z41.9 ENCOUNTER FOR PROCEDURE FOR PURPOSES OTHER THAN REMEDYING HEALTH STATE, UNSPECIFIED: Chronic | ICD-10-CM

## 2019-06-10 DIAGNOSIS — I35.0 NONRHEUMATIC AORTIC (VALVE) STENOSIS: ICD-10-CM

## 2019-06-10 DIAGNOSIS — Z90.89 ACQUIRED ABSENCE OF OTHER ORGANS: Chronic | ICD-10-CM

## 2019-06-10 DIAGNOSIS — Z90.49 ACQUIRED ABSENCE OF OTHER SPECIFIED PARTS OF DIGESTIVE TRACT: Chronic | ICD-10-CM

## 2019-06-10 LAB
ALBUMIN SERPL ELPH-MCNC: 4.5 G/DL — SIGNIFICANT CHANGE UP (ref 3.3–5)
ALP SERPL-CCNC: 48 U/L — SIGNIFICANT CHANGE UP (ref 40–120)
ALT FLD-CCNC: 21 U/L — SIGNIFICANT CHANGE UP (ref 10–45)
ANION GAP SERPL CALC-SCNC: 13 MMOL/L — SIGNIFICANT CHANGE UP (ref 5–17)
AST SERPL-CCNC: 31 U/L — SIGNIFICANT CHANGE UP (ref 10–40)
BASOPHILS # BLD AUTO: 0.03 K/UL — SIGNIFICANT CHANGE UP (ref 0–0.2)
BASOPHILS NFR BLD AUTO: 0.9 % — SIGNIFICANT CHANGE UP (ref 0–2)
BILIRUB SERPL-MCNC: 0.6 MG/DL — SIGNIFICANT CHANGE UP (ref 0.2–1.2)
BUN SERPL-MCNC: 16 MG/DL — SIGNIFICANT CHANGE UP (ref 7–23)
BURR CELLS BLD QL SMEAR: PRESENT — SIGNIFICANT CHANGE UP
CALCIUM SERPL-MCNC: 9.1 MG/DL — SIGNIFICANT CHANGE UP (ref 8.4–10.5)
CHLORIDE SERPL-SCNC: 86 MMOL/L — LOW (ref 96–108)
CO2 SERPL-SCNC: 27 MMOL/L — SIGNIFICANT CHANGE UP (ref 22–31)
CREAT SERPL-MCNC: 1.08 MG/DL — SIGNIFICANT CHANGE UP (ref 0.5–1.3)
EOSINOPHIL # BLD AUTO: 0 K/UL — SIGNIFICANT CHANGE UP (ref 0–0.5)
EOSINOPHIL NFR BLD AUTO: 0 % — SIGNIFICANT CHANGE UP (ref 0–6)
GIANT PLATELETS BLD QL SMEAR: PRESENT — SIGNIFICANT CHANGE UP
GLUCOSE SERPL-MCNC: 105 MG/DL — HIGH (ref 70–99)
HCT VFR BLD CALC: 34.6 % — LOW (ref 39–50)
HGB BLD-MCNC: 11.5 G/DL — LOW (ref 13–17)
LYMPHOCYTES # BLD AUTO: 0.23 K/UL — LOW (ref 1–3.3)
LYMPHOCYTES # BLD AUTO: 7.1 % — LOW (ref 13–44)
MANUAL SMEAR VERIFICATION: SIGNIFICANT CHANGE UP
MCHC RBC-ENTMCNC: 30.4 PG — SIGNIFICANT CHANGE UP (ref 27–34)
MCHC RBC-ENTMCNC: 33.2 GM/DL — SIGNIFICANT CHANGE UP (ref 32–36)
MCV RBC AUTO: 91.5 FL — SIGNIFICANT CHANGE UP (ref 80–100)
MONOCYTES # BLD AUTO: 0.2 K/UL — SIGNIFICANT CHANGE UP (ref 0–0.9)
MONOCYTES NFR BLD AUTO: 6.2 % — SIGNIFICANT CHANGE UP (ref 2–14)
NEUTROPHILS # BLD AUTO: 2.75 K/UL — SIGNIFICANT CHANGE UP (ref 1.8–7.4)
NEUTROPHILS NFR BLD AUTO: 81.4 % — HIGH (ref 43–77)
NEUTS BAND # BLD: 4.4 % — SIGNIFICANT CHANGE UP (ref 0–8)
PLAT MORPH BLD: ABNORMAL
PLATELET # BLD AUTO: 187 K/UL — SIGNIFICANT CHANGE UP (ref 150–400)
POIKILOCYTOSIS BLD QL AUTO: SLIGHT — SIGNIFICANT CHANGE UP
POTASSIUM SERPL-MCNC: 3.9 MMOL/L — SIGNIFICANT CHANGE UP (ref 3.5–5.3)
POTASSIUM SERPL-SCNC: 3.9 MMOL/L — SIGNIFICANT CHANGE UP (ref 3.5–5.3)
PROT SERPL-MCNC: 7.6 G/DL — SIGNIFICANT CHANGE UP (ref 6–8.3)
RBC # BLD: 3.78 M/UL — LOW (ref 4.2–5.8)
RBC # FLD: 12.9 % — SIGNIFICANT CHANGE UP (ref 10.3–14.5)
RBC BLD AUTO: ABNORMAL
SODIUM SERPL-SCNC: 126 MMOL/L — LOW (ref 135–145)
WBC # BLD: 3.21 K/UL — LOW (ref 3.8–10.5)
WBC # FLD AUTO: 3.21 K/UL — LOW (ref 3.8–10.5)

## 2019-06-10 PROCEDURE — 93306 TTE W/DOPPLER COMPLETE: CPT

## 2019-06-10 PROCEDURE — 93010 ELECTROCARDIOGRAM REPORT: CPT

## 2019-06-10 PROCEDURE — 36415 COLL VENOUS BLD VENIPUNCTURE: CPT

## 2019-06-10 PROCEDURE — 85025 COMPLETE CBC W/AUTO DIFF WBC: CPT

## 2019-06-10 PROCEDURE — 99214 OFFICE O/P EST MOD 30 MIN: CPT

## 2019-06-10 PROCEDURE — 93306 TTE W/DOPPLER COMPLETE: CPT | Mod: 26

## 2019-06-10 PROCEDURE — 80053 COMPREHEN METABOLIC PANEL: CPT

## 2019-06-10 PROCEDURE — 93005 ELECTROCARDIOGRAM TRACING: CPT

## 2019-06-11 NOTE — HISTORY OF PRESENT ILLNESS
[FreeTextEntry1] : 83 year old male with a history of hypertension, history of prostate cancer with radiation (10 years ago), Anemia (baseline hemoglobin unknown), Hepatitis B (treated 9 years ago), Left hip arthritis, chronic diastolic heart failure with severe aortic stenosis s/p TF TAVR on 06/14/18 using Evolute Pro (29 mm, commercial), Atrial fibrillation (currently on Eliquis) who presents for his one year follow up. \par \par Since his last visit, the patient continues to feel well and remains active. He denies any SOB at rest or with exertion, chest pain, palpitations, dizziness, syncope, or LE edema.  The patient's only compliant is that he is suffering from a terrible cold.

## 2019-06-16 ENCOUNTER — EMERGENCY (EMERGENCY)
Facility: HOSPITAL | Age: 84
LOS: 1 days | Discharge: HOME | End: 2019-06-16
Attending: EMERGENCY MEDICINE | Admitting: EMERGENCY MEDICINE
Payer: MEDICARE

## 2019-06-16 VITALS
SYSTOLIC BLOOD PRESSURE: 129 MMHG | OXYGEN SATURATION: 94 % | HEIGHT: 65 IN | WEIGHT: 131.4 LBS | RESPIRATION RATE: 17 BRPM | HEART RATE: 70 BPM | DIASTOLIC BLOOD PRESSURE: 70 MMHG | TEMPERATURE: 98 F

## 2019-06-16 VITALS
OXYGEN SATURATION: 95 % | RESPIRATION RATE: 16 BRPM | HEART RATE: 71 BPM | TEMPERATURE: 98 F | SYSTOLIC BLOOD PRESSURE: 143 MMHG | DIASTOLIC BLOOD PRESSURE: 79 MMHG

## 2019-06-16 DIAGNOSIS — Z41.9 ENCOUNTER FOR PROCEDURE FOR PURPOSES OTHER THAN REMEDYING HEALTH STATE, UNSPECIFIED: Chronic | ICD-10-CM

## 2019-06-16 DIAGNOSIS — Z90.49 ACQUIRED ABSENCE OF OTHER SPECIFIED PARTS OF DIGESTIVE TRACT: Chronic | ICD-10-CM

## 2019-06-16 DIAGNOSIS — Z90.89 ACQUIRED ABSENCE OF OTHER ORGANS: Chronic | ICD-10-CM

## 2019-06-16 LAB
ALBUMIN SERPL ELPH-MCNC: 4.1 G/DL — SIGNIFICANT CHANGE UP (ref 3.3–5)
ALP SERPL-CCNC: 53 U/L — SIGNIFICANT CHANGE UP (ref 40–120)
ALT FLD-CCNC: 17 U/L — SIGNIFICANT CHANGE UP (ref 10–45)
ANION GAP SERPL CALC-SCNC: 13 MMOL/L — SIGNIFICANT CHANGE UP (ref 5–17)
APPEARANCE UR: CLEAR — SIGNIFICANT CHANGE UP
APTT BLD: 31.7 SEC — SIGNIFICANT CHANGE UP (ref 27.5–36.3)
AST SERPL-CCNC: 26 U/L — SIGNIFICANT CHANGE UP (ref 10–40)
BACTERIA # UR AUTO: PRESENT /HPF
BASOPHILS # BLD AUTO: 0.01 K/UL — SIGNIFICANT CHANGE UP (ref 0–0.2)
BASOPHILS NFR BLD AUTO: 0.1 % — SIGNIFICANT CHANGE UP (ref 0–2)
BILIRUB SERPL-MCNC: 0.6 MG/DL — SIGNIFICANT CHANGE UP (ref 0.2–1.2)
BILIRUB UR-MCNC: NEGATIVE — SIGNIFICANT CHANGE UP
BUN SERPL-MCNC: 24 MG/DL — HIGH (ref 7–23)
CALCIUM SERPL-MCNC: 9.6 MG/DL — SIGNIFICANT CHANGE UP (ref 8.4–10.5)
CHLORIDE SERPL-SCNC: 85 MMOL/L — LOW (ref 96–108)
CK MB CFR SERPL CALC: 6.7 NG/ML — SIGNIFICANT CHANGE UP (ref 0–6.7)
CK SERPL-CCNC: 161 U/L — SIGNIFICANT CHANGE UP (ref 30–200)
CO2 SERPL-SCNC: 27 MMOL/L — SIGNIFICANT CHANGE UP (ref 22–31)
COLOR SPEC: YELLOW — SIGNIFICANT CHANGE UP
CREAT SERPL-MCNC: 1.12 MG/DL — SIGNIFICANT CHANGE UP (ref 0.5–1.3)
DIFF PNL FLD: NEGATIVE — SIGNIFICANT CHANGE UP
EOSINOPHIL # BLD AUTO: 0.01 K/UL — SIGNIFICANT CHANGE UP (ref 0–0.5)
EOSINOPHIL NFR BLD AUTO: 0.1 % — SIGNIFICANT CHANGE UP (ref 0–6)
EPI CELLS # UR: SIGNIFICANT CHANGE UP /HPF (ref 0–5)
GLUCOSE SERPL-MCNC: 115 MG/DL — HIGH (ref 70–99)
GLUCOSE UR QL: NEGATIVE — SIGNIFICANT CHANGE UP
HCT VFR BLD CALC: 33.8 % — LOW (ref 39–50)
HGB BLD-MCNC: 11.5 G/DL — LOW (ref 13–17)
IMM GRANULOCYTES NFR BLD AUTO: 0.7 % — SIGNIFICANT CHANGE UP (ref 0–1.5)
INR BLD: 1.15 — SIGNIFICANT CHANGE UP (ref 0.88–1.16)
KETONES UR-MCNC: 15 MG/DL
LEUKOCYTE ESTERASE UR-ACNC: NEGATIVE — SIGNIFICANT CHANGE UP
LYMPHOCYTES # BLD AUTO: 0.57 K/UL — LOW (ref 1–3.3)
LYMPHOCYTES # BLD AUTO: 5.9 % — LOW (ref 13–44)
MAGNESIUM SERPL-MCNC: 1.7 MG/DL — SIGNIFICANT CHANGE UP (ref 1.6–2.6)
MCHC RBC-ENTMCNC: 30.6 PG — SIGNIFICANT CHANGE UP (ref 27–34)
MCHC RBC-ENTMCNC: 34 GM/DL — SIGNIFICANT CHANGE UP (ref 32–36)
MCV RBC AUTO: 89.9 FL — SIGNIFICANT CHANGE UP (ref 80–100)
MONOCYTES # BLD AUTO: 0.84 K/UL — SIGNIFICANT CHANGE UP (ref 0–0.9)
MONOCYTES NFR BLD AUTO: 8.7 % — SIGNIFICANT CHANGE UP (ref 2–14)
NEUTROPHILS # BLD AUTO: 8.14 K/UL — HIGH (ref 1.8–7.4)
NEUTROPHILS NFR BLD AUTO: 84.5 % — HIGH (ref 43–77)
NITRITE UR-MCNC: NEGATIVE — SIGNIFICANT CHANGE UP
NRBC # BLD: 0 /100 WBCS — SIGNIFICANT CHANGE UP (ref 0–0)
NT-PROBNP SERPL-SCNC: 408 PG/ML — HIGH (ref 0–300)
PH UR: 7 — SIGNIFICANT CHANGE UP (ref 5–8)
PLATELET # BLD AUTO: 244 K/UL — SIGNIFICANT CHANGE UP (ref 150–400)
POTASSIUM SERPL-MCNC: 4.7 MMOL/L — SIGNIFICANT CHANGE UP (ref 3.5–5.3)
POTASSIUM SERPL-SCNC: 4.7 MMOL/L — SIGNIFICANT CHANGE UP (ref 3.5–5.3)
PROT SERPL-MCNC: 7.6 G/DL — SIGNIFICANT CHANGE UP (ref 6–8.3)
PROT UR-MCNC: 30 MG/DL
PROTHROM AB SERPL-ACNC: 13 SEC — HIGH (ref 10–12.9)
RBC # BLD: 3.76 M/UL — LOW (ref 4.2–5.8)
RBC # FLD: 12.6 % — SIGNIFICANT CHANGE UP (ref 10.3–14.5)
RBC CASTS # UR COMP ASSIST: < 5 /HPF — SIGNIFICANT CHANGE UP
SODIUM SERPL-SCNC: 125 MMOL/L — LOW (ref 135–145)
SP GR SPEC: 1.01 — SIGNIFICANT CHANGE UP (ref 1–1.03)
TROPONIN T SERPL-MCNC: <0.01 NG/ML — SIGNIFICANT CHANGE UP (ref 0–0.01)
UROBILINOGEN FLD QL: 1 E.U./DL — SIGNIFICANT CHANGE UP
WBC # BLD: 9.64 K/UL — SIGNIFICANT CHANGE UP (ref 3.8–10.5)
WBC # FLD AUTO: 9.64 K/UL — SIGNIFICANT CHANGE UP (ref 3.8–10.5)

## 2019-06-16 PROCEDURE — 85025 COMPLETE CBC W/AUTO DIFF WBC: CPT

## 2019-06-16 PROCEDURE — 93005 ELECTROCARDIOGRAM TRACING: CPT

## 2019-06-16 PROCEDURE — 85730 THROMBOPLASTIN TIME PARTIAL: CPT

## 2019-06-16 PROCEDURE — 87086 URINE CULTURE/COLONY COUNT: CPT

## 2019-06-16 PROCEDURE — 83735 ASSAY OF MAGNESIUM: CPT

## 2019-06-16 PROCEDURE — 93010 ELECTROCARDIOGRAM REPORT: CPT

## 2019-06-16 PROCEDURE — 71046 X-RAY EXAM CHEST 2 VIEWS: CPT | Mod: 26

## 2019-06-16 PROCEDURE — 80053 COMPREHEN METABOLIC PANEL: CPT

## 2019-06-16 PROCEDURE — 71046 X-RAY EXAM CHEST 2 VIEWS: CPT

## 2019-06-16 PROCEDURE — 36415 COLL VENOUS BLD VENIPUNCTURE: CPT

## 2019-06-16 PROCEDURE — 81001 URINALYSIS AUTO W/SCOPE: CPT

## 2019-06-16 PROCEDURE — 82553 CREATINE MB FRACTION: CPT

## 2019-06-16 PROCEDURE — 87040 BLOOD CULTURE FOR BACTERIA: CPT

## 2019-06-16 PROCEDURE — 84484 ASSAY OF TROPONIN QUANT: CPT

## 2019-06-16 PROCEDURE — 83880 ASSAY OF NATRIURETIC PEPTIDE: CPT

## 2019-06-16 PROCEDURE — 82550 ASSAY OF CK (CPK): CPT

## 2019-06-16 PROCEDURE — 99283 EMERGENCY DEPT VISIT LOW MDM: CPT | Mod: 25

## 2019-06-16 PROCEDURE — 71275 CT ANGIOGRAPHY CHEST: CPT

## 2019-06-16 PROCEDURE — 99284 EMERGENCY DEPT VISIT MOD MDM: CPT | Mod: 25

## 2019-06-16 PROCEDURE — 71275 CT ANGIOGRAPHY CHEST: CPT | Mod: 26

## 2019-06-16 PROCEDURE — 85610 PROTHROMBIN TIME: CPT

## 2019-06-16 RX ORDER — SODIUM CHLORIDE 9 MG/ML
500 INJECTION INTRAMUSCULAR; INTRAVENOUS; SUBCUTANEOUS ONCE
Refills: 0 | Status: COMPLETED | OUTPATIENT
Start: 2019-06-16 | End: 2019-06-16

## 2019-06-16 RX ADMIN — Medication 1 TABLET(S): at 19:17

## 2019-06-16 RX ADMIN — SODIUM CHLORIDE 500 MILLILITER(S): 9 INJECTION INTRAMUSCULAR; INTRAVENOUS; SUBCUTANEOUS at 16:31

## 2019-06-16 NOTE — ED PROVIDER NOTE - X-RAY INTERPRETATION
lungs clear but hyperinflated, heart shadow normal, no bony abnormalities , + mesh apparent ( valve repair history)/ER physician

## 2019-06-16 NOTE — ED PROVIDER NOTE - MUSCULOSKELETAL, MLM
Spine appears normal, range of motion is not limited, no muscle or joint tenderness Trace LLE edema ,

## 2019-06-16 NOTE — ED ADULT NURSE NOTE - NS ED NURSE LEVEL OF CONSCIOUSNESS AFFECT
- left lateral, right posterior internal and external hemorrhoids with large anal papillae  - Right anterior anal papilla.
Calm/Appropriate

## 2019-06-16 NOTE — ED ADULT NURSE NOTE - NSIMPLEMENTINTERV_GEN_ALL_ED
Implemented All Universal Safety Interventions:  [place] to call system. Call bell, personal items and telephone within reach. Instruct patient to call for assistance. Room bathroom lighting operational. Non-slip footwear when patient is off stretcher. Physically safe environment: no spills, clutter or unnecessary equipment. Stretcher in lowest position, wheels locked, appropriate side rails in place.

## 2019-06-16 NOTE — ED PROVIDER NOTE - PROGRESS NOTE DETAILS
no PE , advised f/u with pcp and bringing CT results to pcp, advised f/u CT in future after antibiotic course complete for malignancy rule out. discussed Na and advised drinking gatorade instead of water and f/u for this as well.

## 2019-06-16 NOTE — ED PROVIDER NOTE - CONSTITUTIONAL, MLM
normal... tired appearing but non toxic , well nourished, awake, alert, oriented to person, place, time/situation and in no apparent distress.

## 2019-06-16 NOTE — ED PROVIDER NOTE - OBJECTIVE STATEMENT
84 yo with PMH of Aortic stenosis  GERD (gastroesophageal reflux disease)  Hepatitis  B, Hypertension  Lumbar herniated disc  OA (osteoarthritis) ,  Prostate cancer  radiation treatment 84 yo with PMH of Aortic stenosis w valve replacement,   GERD (gastroesophageal reflux disease)  Hepatitis  B, Hypertension  Lumbar herniated disc  OA (osteoarthritis) ,remote  Prostate cancer s/p  radiation treatment  patient now with cough and fatigue for 10 days, no fever or chills, no myalgias, no n/v/d or urinary symptoms, no SOB, no CP, back pain w coughing. no recent sick contacts. 82 yo with PMH of Aortic stenosis w valve replacement,   GERD (gastroesophageal reflux disease)  Hepatitis  B, Hypertension  Lumbar herniated disc  OA (osteoarthritis) ,remote  Prostate cancer s/p  radiation treatment  patient now with cough and fatigue for 10 days, no fever or chills, no myalgias, no n/v/d or urinary symptoms, no SOB, no CP, back pain w coughing. no recent sick contacts.  started augmentin last night

## 2019-06-16 NOTE — ED PROVIDER NOTE - CLINICAL SUMMARY MEDICAL DECISION MAKING FREE TEXT BOX
84 yo with cough for 10 days, pna suspicion confirmed, persistent chronic hyponatremia, discussed water limits and cont antibiotic which he started yesterday, repeat CT in near future for malignancy rule out advised.

## 2019-06-16 NOTE — ED ADULT NURSE NOTE - OBJECTIVE STATEMENT
82 y/o male c/o cough for one week with yellow sputum production. pt also c/o general malaise and feeling unwell. denies any chest pain, sob, fevers. EKG completed. pt a&ox4, ambulatory, VSS, mask applied in triage.

## 2019-06-17 LAB
CULTURE RESULTS: NO GROWTH — SIGNIFICANT CHANGE UP
SPECIMEN SOURCE: SIGNIFICANT CHANGE UP

## 2019-06-20 DIAGNOSIS — I10 ESSENTIAL (PRIMARY) HYPERTENSION: ICD-10-CM

## 2019-06-20 DIAGNOSIS — J18.9 PNEUMONIA, UNSPECIFIED ORGANISM: ICD-10-CM

## 2019-06-20 DIAGNOSIS — R05 COUGH: ICD-10-CM

## 2019-06-20 DIAGNOSIS — Z79.899 OTHER LONG TERM (CURRENT) DRUG THERAPY: ICD-10-CM

## 2019-06-20 DIAGNOSIS — E87.1 HYPO-OSMOLALITY AND HYPONATREMIA: ICD-10-CM

## 2019-06-21 LAB
CULTURE RESULTS: SIGNIFICANT CHANGE UP
CULTURE RESULTS: SIGNIFICANT CHANGE UP
SPECIMEN SOURCE: SIGNIFICANT CHANGE UP
SPECIMEN SOURCE: SIGNIFICANT CHANGE UP

## 2019-10-01 ENCOUNTER — APPOINTMENT (OUTPATIENT)
Dept: HEART AND VASCULAR | Facility: CLINIC | Age: 84
End: 2019-10-01
Payer: MEDICARE

## 2019-10-01 VITALS
TEMPERATURE: 98.3 F | DIASTOLIC BLOOD PRESSURE: 86 MMHG | SYSTOLIC BLOOD PRESSURE: 150 MMHG | HEART RATE: 60 BPM | HEIGHT: 65 IN | BODY MASS INDEX: 21.66 KG/M2 | RESPIRATION RATE: 19 BRPM | OXYGEN SATURATION: 97 % | WEIGHT: 130 LBS

## 2019-10-01 VITALS — DIASTOLIC BLOOD PRESSURE: 80 MMHG | SYSTOLIC BLOOD PRESSURE: 140 MMHG

## 2019-10-01 PROCEDURE — 99214 OFFICE O/P EST MOD 30 MIN: CPT

## 2019-10-01 PROCEDURE — 93000 ELECTROCARDIOGRAM COMPLETE: CPT

## 2019-10-01 PROCEDURE — 93306 TTE W/DOPPLER COMPLETE: CPT

## 2019-10-01 RX ORDER — LISINOPRIL AND HYDROCHLOROTHIAZIDE TABLETS 20; 25 MG/1; MG/1
20-25 TABLET ORAL DAILY
Refills: 0 | Status: DISCONTINUED | COMMUNITY
End: 2019-10-01

## 2019-10-01 RX ORDER — LISINOPRIL 20 MG/1
20 TABLET ORAL
Refills: 0 | Status: ACTIVE | COMMUNITY

## 2019-10-01 RX ORDER — CHLORHEXIDINE GLUCONATE 4 %
325 (65 FE) LIQUID (ML) TOPICAL
Refills: 0 | Status: ACTIVE | COMMUNITY

## 2019-10-01 NOTE — DISCUSSION/SUMMARY
[___ Month(s)] : [unfilled] month(s) [With Me] : with me [FreeTextEntry1] : likely PAF- on doac, no further rx needed at this time\par TAVR looks OK \par fluvax by md

## 2019-10-01 NOTE — HISTORY OF PRESENT ILLNESS
[FreeTextEntry1] : Feels well in general\par fells more rapid, irreg beats at times\par nothing sustained\par he is asymptomatic- not lightheaded, no syncope\par no cp or syncope\par stable functional capacity

## 2019-10-01 NOTE — PHYSICAL EXAM
[General Appearance - Well Developed] : well developed [Normal Appearance] : normal appearance [General Appearance - Well Nourished] : well nourished [Well Groomed] : well groomed [No Deformities] : no deformities [General Appearance - In No Acute Distress] : no acute distress [Normal Conjunctiva] : the conjunctiva exhibited no abnormalities [Normal Oral Mucosa] : normal oral mucosa [Eyelids - No Xanthelasma] : the eyelids demonstrated no xanthelasmas [No Oral Pallor] : no oral pallor [No Oral Cyanosis] : no oral cyanosis [Normal Jugular Venous V Waves Present] : normal jugular venous V waves present [Normal Jugular Venous A Waves Present] : normal jugular venous A waves present [No Jugular Venous Espinosa A Waves] : no jugular venous espinosa A waves [Respiration, Rhythm And Depth] : normal respiratory rhythm and effort [Auscultation Breath Sounds / Voice Sounds] : lungs were clear to auscultation bilaterally [Exaggerated Use Of Accessory Muscles For Inspiration] : no accessory muscle use [Abdomen Soft] : soft [Abdomen Tenderness] : non-tender [Abdomen Mass (___ Cm)] : no abdominal mass palpated [Abnormal Walk] : normal gait [Gait - Sufficient For Exercise Testing] : the gait was sufficient for exercise testing [Cyanosis, Localized] : no localized cyanosis [Nail Clubbing] : no clubbing of the fingernails [Petechial Hemorrhages (___cm)] : no petechial hemorrhages [Skin Color & Pigmentation] : normal skin color and pigmentation [] : no rash [No Venous Stasis] : no venous stasis [Skin Lesions] : no skin lesions [No Skin Ulcers] : no skin ulcer [No Xanthoma] : no  xanthoma was observed [Oriented To Time, Place, And Person] : oriented to person, place, and time [Affect] : the affect was normal [Mood] : the mood was normal [No Anxiety] : not feeling anxious [FreeTextEntry1] : 2/6 laura

## 2019-11-20 ENCOUNTER — APPOINTMENT (OUTPATIENT)
Dept: HEART AND VASCULAR | Facility: CLINIC | Age: 84
End: 2019-11-20

## 2020-04-01 ENCOUNTER — APPOINTMENT (OUTPATIENT)
Dept: HEART AND VASCULAR | Facility: CLINIC | Age: 85
End: 2020-04-01

## 2020-06-23 ENCOUNTER — EMERGENCY (EMERGENCY)
Facility: HOSPITAL | Age: 85
LOS: 1 days | Discharge: HOME | End: 2020-06-23
Attending: EMERGENCY MEDICINE | Admitting: EMERGENCY MEDICINE
Payer: MEDICARE

## 2020-06-23 VITALS
SYSTOLIC BLOOD PRESSURE: 157 MMHG | OXYGEN SATURATION: 100 % | HEART RATE: 73 BPM | DIASTOLIC BLOOD PRESSURE: 77 MMHG | TEMPERATURE: 98 F | RESPIRATION RATE: 18 BRPM

## 2020-06-23 VITALS
OXYGEN SATURATION: 99 % | HEART RATE: 64 BPM | WEIGHT: 125 LBS | SYSTOLIC BLOOD PRESSURE: 157 MMHG | DIASTOLIC BLOOD PRESSURE: 73 MMHG | RESPIRATION RATE: 18 BRPM | TEMPERATURE: 98 F | HEIGHT: 65 IN

## 2020-06-23 DIAGNOSIS — Z41.9 ENCOUNTER FOR PROCEDURE FOR PURPOSES OTHER THAN REMEDYING HEALTH STATE, UNSPECIFIED: Chronic | ICD-10-CM

## 2020-06-23 DIAGNOSIS — S09.90XA UNSPECIFIED INJURY OF HEAD, INITIAL ENCOUNTER: ICD-10-CM

## 2020-06-23 DIAGNOSIS — S50.311A ABRASION OF RIGHT ELBOW, INITIAL ENCOUNTER: ICD-10-CM

## 2020-06-23 DIAGNOSIS — Z90.89 ACQUIRED ABSENCE OF OTHER ORGANS: Chronic | ICD-10-CM

## 2020-06-23 DIAGNOSIS — Z79.899 OTHER LONG TERM (CURRENT) DRUG THERAPY: ICD-10-CM

## 2020-06-23 DIAGNOSIS — Z23 ENCOUNTER FOR IMMUNIZATION: ICD-10-CM

## 2020-06-23 DIAGNOSIS — S59.901A UNSPECIFIED INJURY OF RIGHT ELBOW, INITIAL ENCOUNTER: ICD-10-CM
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
## 2020-06-23 DIAGNOSIS — Z90.49 ACQUIRED ABSENCE OF OTHER SPECIFIED PARTS OF DIGESTIVE TRACT: Chronic | ICD-10-CM
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
## 2020-06-23 DIAGNOSIS — S01.81XA LACERATION WITHOUT FOREIGN BODY OF OTHER PART OF HEAD, INITIAL ENCOUNTER: ICD-10-CM
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
## 2020-06-23 DIAGNOSIS — I10 ESSENTIAL (PRIMARY) HYPERTENSION: ICD-10-CM

## 2020-06-23 DIAGNOSIS — S80.211A ABRASION, RIGHT KNEE, INITIAL ENCOUNTER: ICD-10-CM

## 2020-06-23 PROCEDURE — 70450 CT HEAD/BRAIN W/O DYE: CPT

## 2020-06-23 PROCEDURE — 70450 CT HEAD/BRAIN W/O DYE: CPT | Mod: 26

## 2020-06-23 PROCEDURE — 90471 IMMUNIZATION ADMIN: CPT

## 2020-06-23 PROCEDURE — 99284 EMERGENCY DEPT VISIT MOD MDM: CPT | Mod: 25

## 2020-06-23 PROCEDURE — 12011 RPR F/E/E/N/L/M 2.5 CM/<: CPT

## 2020-06-23 PROCEDURE — 90715 TDAP VACCINE 7 YRS/> IM: CPT

## 2020-06-23 RX ORDER — BACITRACIN ZINC 500 UNIT/G
1 OINTMENT IN PACKET (EA) TOPICAL ONCE
Refills: 0 | Status: COMPLETED | OUTPATIENT
Start: 2020-06-23 | End: 2020-06-23

## 2020-06-23 RX ORDER — TETANUS TOXOID, REDUCED DIPHTHERIA TOXOID AND ACELLULAR PERTUSSIS VACCINE, ADSORBED 5; 2.5; 8; 8; 2.5 [IU]/.5ML; [IU]/.5ML; UG/.5ML; UG/.5ML; UG/.5ML
0.5 SUSPENSION INTRAMUSCULAR ONCE
Refills: 0 | Status: COMPLETED | OUTPATIENT
Start: 2020-06-23 | End: 2020-06-23

## 2020-06-23 RX ADMIN — Medication 1 APPLICATION(S): at 13:42

## 2020-06-23 RX ADMIN — TETANUS TOXOID, REDUCED DIPHTHERIA TOXOID AND ACELLULAR PERTUSSIS VACCINE, ADSORBED 0.5 MILLILITER(S): 5; 2.5; 8; 8; 2.5 SUSPENSION INTRAMUSCULAR at 13:40

## 2020-06-23 NOTE — ED PROVIDER NOTE - PHYSICAL EXAMINATION
GEN: Elderly, Well appearing, thin, awake, alert, oriented to person, place, time/situation and in no apparent distress. NTAF  ENT: Airway patent, Nasal mucosa clear. Mouth with normal mucosa. No HTM, no epistaxis, no midface instability.  EYES: Clear bilaterally. PERRL, EOMI  RESPIRATORY: Breathing comfortably with normal RR. No W/C/R, no hypoxia or resp distress.  CARDIAC: Regular rate and rhythm, no M/R/G  ABDOMEN: Soft, nontender, +bowel sounds, no rebound, rigidity, or guarding.  MSK: Range of motion is not limited, no deformities noted.  NEURO: Alert and oriented x 3. Cn 2-12 intact. Strength 5/5 and sensation intact in all 4 extremities. Gait normal.   SKIN: Skin normal color for race, warm, dry, small linear skin tear/abrasion to right elbow and small abrasion to right knee, bleeding controlled. Approx 2.5 lauro gaping laceration to forehead above right eye, no active bleeding.  PSYCH: Alert and oriented to person, place, time/situation. normal mood and affect. no apparent risk to self or others.

## 2020-06-23 NOTE — ED PROVIDER NOTE - PATIENT PORTAL LINK FT
You can access the FollowMyHealth Patient Portal offered by [facility] by registering at the following website: [email or web address]. By joining [facility]’s FollowMyHealth portal, you will also be able to view your health information using other applications (apps) compatible with our system.

## 2020-06-23 NOTE — ED ADULT NURSE NOTE - OBJECTIVE STATEMENT
85 yo male on Eliquis sent from urgent care for evaluation s/p fall.  Patient states he tripped and fell on sidewalk. Laceration to right eye brow- dressing intact from urgent care. Denies head/ neck pain.

## 2020-06-23 NOTE — ED PROVIDER NOTE - NSFOLLOWUPINSTRUCTIONS_ED_ALL_ED_FT
Please return in 6-7 days to ER, urgent care or your PMD for suture removal. you had  stitches placed.     Please keep wounds clean and dry. keep dressing on for 24hr, after you can change. Wash area with mild soap and warm water. apply bacitracin.   If you develop redness, warmth, drainage, pus, fever, or any other concern, please call your doctor or return to the ER.     Please follow up with your primary care physician. If you have any problem getting an appointment this week, please call the ED Referral Coordinator at [phone].  Return to the Emergency Department if you have any new or worsening symptoms, or for any other concerns. Please read below for further information.    Head Injury, Adult  There are many types of head injuries. They can be as minor as a bump. Some head injuries can be worse. Worse injuries include:  A strong hit to the head that hurts the brain (concussion).  A bruise of the brain (contusion). This means there is bleeding in the brain that can cause swelling.  A cracked skull (skull fracture).  Bleeding in the brain that gathers, gets thick (makes a clot), and forms a bump (hematoma).    Most problems from a head injury come in the first 24 hours. However, you may still have side effects up to 7–10 days after your injury. It is important to watch your condition for any changes.  Follow these instructions at home:    Activity   Rest as much as possible.  Avoid activities that are hard or tiring.  Make sure you get enough sleep.  Limit activities that need a lot of thought or attention, such as:    Watching TV.  Playing memory games and puzzles.  Job-related work or homework.  Working on the computer, social media, and texting.    Avoid activities that could cause another head injury until your doctor says it is okay. This includes playing sports.  Ask your doctor when it is safe for you to go back to your normal activities, such as work or school. Ask your doctor for a step-by-step plan for slowly going back to your normal activities.  Ask your doctor when you can drive, ride a bicycle, or use heavy machinery. Never do these activities if you are dizzy.  Lifestyle     Do not drink alcohol until your doctor says it is okay.  Avoid drug use.  If it is harder than usual to remember things, write them down.  If you are easily distracted, try to do one thing at a time.  Talk with family members or close friends when making important decisions.  Tell your friends, family, a trusted coworker, and  about your injury, symptoms, and limits (restrictions). Have them watch for any problems that are new or getting worse.  General instructions     Take over-the-counter and prescription medicines only as told by your doctor.  Have someone stay with you for 24 hours after your head injury. This person should watch you for any changes in your symptoms and be ready to get help.  Keep all follow-up visits as told by your doctor. This is important.  How is this prevented?  Work on your balance and strength. This can help you avoid falls.  Wear a seatbelt when you are in a moving vehicle.  Wear a helmet when:    Riding a bicycle.  Skiing.  Doing any other sport or activity that has a risk of injury.    Drink alcohol only in moderation.  Make your home safer by:    Getting rid of clutter from the floors and stairs, like things that can make you trip.  Using grab bars in bathrooms and handrails by stairs.  Placing non-slip mats on floors and in bathtubs.  Putting more light in dim areas.    Get help right away if:  You have:    A very bad (severe) headache that is not helped by medicine.  Trouble walking or weakness in your arms and legs.  Clear or bloody fluid coming from your nose or ears.  Changes in your seeing (vision).  Jerky movements that you cannot control (seizure).    You throw up (vomit).  Your symptoms get worse.  You lose balance.  Your speech is slurred.  You pass out.  You are sleepier and have trouble staying awake.  The black centers of your eyes (pupils) change in size.  These symptoms may be an emergency. Do not wait to see if the symptoms will go away. Get medical help right away. Call your local emergency services (911 in the [place] Do not drive yourself to the hospital.     This information is not intended to replace advice given to you by your health care provider. Make sure you discuss any questions you have with your health care provider.

## 2020-06-23 NOTE — ED PROVIDER NOTE - CARE PLAN
Principal Discharge DX:	Laceration of forehead  Secondary Diagnosis:	Head injury  Secondary Diagnosis:	Abrasion

## 2020-06-23 NOTE — ED ADULT TRIAGE NOTE - CHIEF COMPLAINT QUOTE
patient s/p tripped and fall while walking outside , c/o rt eyebrow laceration . On eliquis daily . Not on thinner . Denies any loc . Was sent fro urgent care for evaluation .

## 2020-06-23 NOTE — ED PROVIDER NOTE - OBJECTIVE STATEMENT
84M with a h/o HTN, afib on Eliquis who p/w trip and fall today. pt was walking and tripped on some uneven sidewalk, falling forward, scratching his right elbow and right knee and sustaining a laceration to his right forehead. Pt denies feeling dizzy and lightheaded prior to tripping. No LOC, no neck pain, no n/v, no vision change, no cp/sob, no n/t/w in extremities. Pt was seen at [facility] and sent to ER for lac repair and CT head to r/o ICH.

## 2020-06-29 ENCOUNTER — APPOINTMENT (OUTPATIENT)
Dept: HEART AND VASCULAR | Facility: CLINIC | Age: 85
End: 2020-06-29
Payer: MEDICARE

## 2020-06-29 VITALS
TEMPERATURE: 97.6 F | OXYGEN SATURATION: 97 % | SYSTOLIC BLOOD PRESSURE: 138 MMHG | DIASTOLIC BLOOD PRESSURE: 78 MMHG | RESPIRATION RATE: 18 BRPM | BODY MASS INDEX: 20.66 KG/M2 | WEIGHT: 124 LBS | HEART RATE: 64 BPM | HEIGHT: 65 IN

## 2020-06-29 PROCEDURE — 93000 ELECTROCARDIOGRAM COMPLETE: CPT

## 2020-06-29 PROCEDURE — 93306 TTE W/DOPPLER COMPLETE: CPT

## 2020-06-29 PROCEDURE — 99215 OFFICE O/P EST HI 40 MIN: CPT

## 2020-06-29 NOTE — PHYSICAL EXAM
[General Appearance - Well Developed] : well developed [Normal Appearance] : normal appearance [Well Groomed] : well groomed [No Deformities] : no deformities [General Appearance - Well Nourished] : well nourished [General Appearance - In No Acute Distress] : no acute distress [Normal Conjunctiva] : the conjunctiva exhibited no abnormalities [Eyelids - No Xanthelasma] : the eyelids demonstrated no xanthelasmas [No Oral Pallor] : no oral pallor [Normal Oral Mucosa] : normal oral mucosa [No Oral Cyanosis] : no oral cyanosis [Normal Jugular Venous A Waves Present] : normal jugular venous A waves present [No Jugular Venous Espinosa A Waves] : no jugular venous espinosa A waves [Normal Jugular Venous V Waves Present] : normal jugular venous V waves present [Respiration, Rhythm And Depth] : normal respiratory rhythm and effort [Exaggerated Use Of Accessory Muscles For Inspiration] : no accessory muscle use [Auscultation Breath Sounds / Voice Sounds] : lungs were clear to auscultation bilaterally [Abdomen Soft] : soft [Abdomen Tenderness] : non-tender [Abnormal Walk] : normal gait [Gait - Sufficient For Exercise Testing] : the gait was sufficient for exercise testing [Abdomen Mass (___ Cm)] : no abdominal mass palpated [Nail Clubbing] : no clubbing of the fingernails [Cyanosis, Localized] : no localized cyanosis [Petechial Hemorrhages (___cm)] : no petechial hemorrhages [Skin Color & Pigmentation] : normal skin color and pigmentation [] : no rash [No Venous Stasis] : no venous stasis [No Skin Ulcers] : no skin ulcer [Skin Lesions] : no skin lesions [No Xanthoma] : no  xanthoma was observed [Oriented To Time, Place, And Person] : oriented to person, place, and time [Affect] : the affect was normal [Mood] : the mood was normal [No Anxiety] : not feeling anxious [FreeTextEntry1] : 2/6 laura

## 2020-06-29 NOTE — DISCUSSION/SUMMARY
[With Me] : with me [___ Month(s)] : [unfilled] month(s) [FreeTextEntry1] : valve OK\par no other falls - will cont on doac for now\par f/u 6 month\par check labs [FreeTextEntry3] : echo

## 2020-06-29 NOTE — HISTORY OF PRESENT ILLNESS
[FreeTextEntry1] : Feels well in general\par fells more rapid, irreg beats at times\par nothing sustained\par he is asymptomatic- not lightheaded, no syncope\par no cp or syncope\par stable functional capacity\par had mechanical fall 10d ago with lac over r eye. CT neg

## 2020-06-30 LAB
ALBUMIN SERPL ELPH-MCNC: 4.7 G/DL
ALP BLD-CCNC: 41 U/L
ALT SERPL-CCNC: 17 U/L
ANION GAP SERPL CALC-SCNC: 15 MMOL/L
AST SERPL-CCNC: 23 U/L
BASOPHILS # BLD AUTO: 0.02 K/UL
BASOPHILS NFR BLD AUTO: 0.3 %
BILIRUB SERPL-MCNC: 0.3 MG/DL
BUN SERPL-MCNC: 21 MG/DL
CALCIUM SERPL-MCNC: 9.7 MG/DL
CHLORIDE SERPL-SCNC: 95 MMOL/L
CHOLEST SERPL-MCNC: 214 MG/DL
CHOLEST/HDLC SERPL: 2.3 RATIO
CO2 SERPL-SCNC: 23 MMOL/L
CREAT SERPL-MCNC: 1.09 MG/DL
EOSINOPHIL # BLD AUTO: 0.07 K/UL
EOSINOPHIL NFR BLD AUTO: 1 %
ESTIMATED AVERAGE GLUCOSE: 105 MG/DL
GLUCOSE SERPL-MCNC: 107 MG/DL
HBA1C MFR BLD HPLC: 5.3 %
HCT VFR BLD CALC: 36.5 %
HDLC SERPL-MCNC: 92 MG/DL
HGB BLD-MCNC: 11.3 G/DL
IMM GRANULOCYTES NFR BLD AUTO: 0.7 %
LDLC SERPL CALC-MCNC: 109 MG/DL
LYMPHOCYTES # BLD AUTO: 0.82 K/UL
LYMPHOCYTES NFR BLD AUTO: 12.3 %
MAN DIFF?: NORMAL
MCHC RBC-ENTMCNC: 30.5 PG
MCHC RBC-ENTMCNC: 31 GM/DL
MCV RBC AUTO: 98.4 FL
MONOCYTES # BLD AUTO: 0.63 K/UL
MONOCYTES NFR BLD AUTO: 9.4 %
NEUTROPHILS # BLD AUTO: 5.09 K/UL
NEUTROPHILS NFR BLD AUTO: 76.3 %
PLATELET # BLD AUTO: 200 K/UL
POTASSIUM SERPL-SCNC: 5.2 MMOL/L
PROT SERPL-MCNC: 6.6 G/DL
RBC # BLD: 3.71 M/UL
RBC # FLD: 14.2 %
SARS-COV-2 IGG SERPL IA-ACNC: <0.1 INDEX
SARS-COV-2 IGG SERPL QL IA: NEGATIVE
SODIUM SERPL-SCNC: 134 MMOL/L
TRIGL SERPL-MCNC: 66 MG/DL
TSH SERPL-ACNC: 1.87 UIU/ML
WBC # FLD AUTO: 6.68 K/UL

## 2021-04-20 ENCOUNTER — APPOINTMENT (OUTPATIENT)
Dept: HEART AND VASCULAR | Facility: CLINIC | Age: 86
End: 2021-04-20
Payer: MEDICARE

## 2021-04-20 VITALS
HEIGHT: 65 IN | TEMPERATURE: 98 F | BODY MASS INDEX: 21.83 KG/M2 | DIASTOLIC BLOOD PRESSURE: 70 MMHG | WEIGHT: 131 LBS | HEART RATE: 59 BPM | SYSTOLIC BLOOD PRESSURE: 130 MMHG | OXYGEN SATURATION: 97 %

## 2021-04-20 PROCEDURE — 93000 ELECTROCARDIOGRAM COMPLETE: CPT

## 2021-04-20 PROCEDURE — 99214 OFFICE O/P EST MOD 30 MIN: CPT

## 2021-04-20 NOTE — HISTORY OF PRESENT ILLNESS
[FreeTextEntry1] : 3 mechanical falls in the last few months\par pcp stopped eliquisFeels well in general\par no palps\par  not lightheaded, no syncope\par no cp \par stable functional capacity\par

## 2021-04-20 NOTE — DISCUSSION/SUMMARY
[FreeTextEntry1] : TAVR and PAF- no recent AF\par mechanical falls necessitates cessation of doac\par no documented AF recently, but high risk for CVA\par will refer back to EP for ILR\par if high AF burden, will consider watchman \par f/u 2-3 mo for echo

## 2021-04-27 ENCOUNTER — APPOINTMENT (OUTPATIENT)
Dept: HEART AND VASCULAR | Facility: CLINIC | Age: 86
End: 2021-04-27
Payer: MEDICARE

## 2021-04-27 PROCEDURE — 99214 OFFICE O/P EST MOD 30 MIN: CPT

## 2021-04-27 PROCEDURE — 93000 ELECTROCARDIOGRAM COMPLETE: CPT

## 2021-06-04 ENCOUNTER — APPOINTMENT (OUTPATIENT)
Dept: HEART AND VASCULAR | Facility: CLINIC | Age: 86
End: 2021-06-04
Payer: MEDICARE

## 2021-06-04 PROCEDURE — 93228 REMOTE 30 DAY ECG REV/REPORT: CPT

## 2021-06-11 ENCOUNTER — APPOINTMENT (OUTPATIENT)
Dept: HEART AND VASCULAR | Facility: CLINIC | Age: 86
End: 2021-06-11
Payer: MEDICARE

## 2021-06-11 VITALS
TEMPERATURE: 98.3 F | SYSTOLIC BLOOD PRESSURE: 128 MMHG | OXYGEN SATURATION: 97 % | HEART RATE: 60 BPM | RESPIRATION RATE: 18 BRPM | WEIGHT: 130 LBS | BODY MASS INDEX: 21.66 KG/M2 | HEIGHT: 65 IN | DIASTOLIC BLOOD PRESSURE: 70 MMHG

## 2021-06-11 PROCEDURE — 99214 OFFICE O/P EST MOD 30 MIN: CPT

## 2021-06-11 PROCEDURE — 93306 TTE W/DOPPLER COMPLETE: CPT

## 2021-06-11 RX ORDER — APIXABAN 2.5 MG/1
2.5 TABLET, FILM COATED ORAL
Qty: 180 | Refills: 0 | Status: DISCONTINUED | COMMUNITY
Start: 2018-07-16 | End: 2021-06-11

## 2021-06-11 RX ORDER — AMLODIPINE BESYLATE 5 MG/1
TABLET ORAL
Refills: 0 | Status: DISCONTINUED | COMMUNITY
End: 2021-06-11

## 2021-06-11 NOTE — PHYSICAL EXAM
[Well Developed] : well developed [Well Nourished] : well nourished [No Acute Distress] : no acute distress [Normal Conjunctiva] : normal conjunctiva [Normal Venous Pressure] : normal venous pressure [No Carotid Bruit] : no carotid bruit [Normal S1, S2] : normal S1, S2 [No Murmur] : no murmur [No Rub] : no rub [No Gallop] : no gallop [Murmur] : murmur [Clear Lung Fields] : clear lung fields [Good Air Entry] : good air entry [No Respiratory Distress] : no respiratory distress  [Soft] : abdomen soft [Non Tender] : non-tender [No Masses/organomegaly] : no masses/organomegaly [Normal Bowel Sounds] : normal bowel sounds [Normal Gait] : normal gait [No Edema] : no edema [No Cyanosis] : no cyanosis [No Clubbing] : no clubbing [No Varicosities] : no varicosities [No Rash] : no rash [No Skin Lesions] : no skin lesions [Moves all extremities] : moves all extremities [No Focal Deficits] : no focal deficits [Normal Speech] : normal speech [Alert and Oriented] : alert and oriented [Normal memory] : normal memory [de-identified] : laura

## 2021-06-11 NOTE — HISTORY OF PRESENT ILLNESS
[FreeTextEntry1] : f/u tavr, PAF\par off DOAC after mechanical falls\par following w EP\par does now c/o lightheadedness\par no syncope\par positional at times\par no cp \par stable functional capacity\par

## 2021-06-11 NOTE — DISCUSSION/SUMMARY
[FreeTextEntry1] : TAVR gradient increased but not clinically important at this level\par lightheaded- completed ambi tele\par empirically hold amlodapine and come back in a month

## 2021-06-15 ENCOUNTER — APPOINTMENT (OUTPATIENT)
Dept: HEART AND VASCULAR | Facility: CLINIC | Age: 86
End: 2021-06-15
Payer: MEDICARE

## 2021-06-15 PROCEDURE — 99213 OFFICE O/P EST LOW 20 MIN: CPT

## 2021-06-21 ENCOUNTER — OUTPATIENT (OUTPATIENT)
Dept: OUTPATIENT SERVICES | Facility: HOSPITAL | Age: 86
LOS: 1 days | Discharge: HOME | End: 2021-06-21
Payer: MEDICARE

## 2021-06-21 DIAGNOSIS — Z41.9 ENCOUNTER FOR PROCEDURE FOR PURPOSES OTHER THAN REMEDYING HEALTH STATE, UNSPECIFIED: Chronic | ICD-10-CM

## 2021-06-21 DIAGNOSIS — Z90.49 ACQUIRED ABSENCE OF OTHER SPECIFIED PARTS OF DIGESTIVE TRACT: Chronic | ICD-10-CM

## 2021-06-21 DIAGNOSIS — Z90.89 ACQUIRED ABSENCE OF OTHER ORGANS: Chronic | ICD-10-CM

## 2021-06-21 PROCEDURE — 33285 INSJ SUBQ CAR RHYTHM MNTR: CPT

## 2021-06-21 PROCEDURE — C1764: CPT

## 2021-07-06 ENCOUNTER — APPOINTMENT (OUTPATIENT)
Dept: HEART AND VASCULAR | Facility: CLINIC | Age: 86
End: 2021-07-06
Payer: MEDICARE

## 2021-07-06 PROCEDURE — 93291 INTERROG DEV EVAL SCRMS IP: CPT

## 2021-07-06 PROCEDURE — 99213 OFFICE O/P EST LOW 20 MIN: CPT | Mod: 25

## 2021-07-12 ENCOUNTER — APPOINTMENT (OUTPATIENT)
Dept: HEART AND VASCULAR | Facility: CLINIC | Age: 86
End: 2021-07-12
Payer: MEDICARE

## 2021-07-12 VITALS
OXYGEN SATURATION: 98 % | HEART RATE: 59 BPM | TEMPERATURE: 97.6 F | BODY MASS INDEX: 20.66 KG/M2 | HEIGHT: 65 IN | SYSTOLIC BLOOD PRESSURE: 140 MMHG | DIASTOLIC BLOOD PRESSURE: 62 MMHG | WEIGHT: 123.99 LBS

## 2021-07-12 DIAGNOSIS — I10 ESSENTIAL (PRIMARY) HYPERTENSION: ICD-10-CM

## 2021-07-12 PROCEDURE — 99214 OFFICE O/P EST MOD 30 MIN: CPT

## 2021-07-12 PROCEDURE — 93000 ELECTROCARDIOGRAM COMPLETE: CPT

## 2021-07-12 NOTE — HISTORY OF PRESENT ILLNESS
[FreeTextEntry1] : f/u PAF, htn\par less lightheaded, his PCP restarted alodapine at 2.5\par we reviewed his ambi BP readings which were good\par off DOAC after mechanical falls\par following w EP, got ILR\par no syncope\par no cp \par stable functional capacity\par

## 2021-07-12 NOTE — DISCUSSION/SUMMARY
[FreeTextEntry1] : BP OK on current rx's\par will remain off DOAC- ILR in place\par will see me back in 6mo

## 2021-07-26 ENCOUNTER — APPOINTMENT (OUTPATIENT)
Dept: HEART AND VASCULAR | Facility: CLINIC | Age: 86
End: 2021-07-26
Payer: MEDICARE

## 2021-07-26 ENCOUNTER — NON-APPOINTMENT (OUTPATIENT)
Age: 86
End: 2021-07-26

## 2021-07-26 PROCEDURE — G2066: CPT

## 2021-07-26 PROCEDURE — 93298 REM INTERROG DEV EVAL SCRMS: CPT

## 2021-08-31 ENCOUNTER — NON-APPOINTMENT (OUTPATIENT)
Age: 86
End: 2021-08-31

## 2021-08-31 ENCOUNTER — APPOINTMENT (OUTPATIENT)
Dept: HEART AND VASCULAR | Facility: CLINIC | Age: 86
End: 2021-08-31
Payer: MEDICARE

## 2021-08-31 PROCEDURE — 93298 REM INTERROG DEV EVAL SCRMS: CPT

## 2021-08-31 PROCEDURE — G2066: CPT

## 2021-10-04 ENCOUNTER — NON-APPOINTMENT (OUTPATIENT)
Age: 86
End: 2021-10-04

## 2021-10-04 ENCOUNTER — APPOINTMENT (OUTPATIENT)
Dept: HEART AND VASCULAR | Facility: CLINIC | Age: 86
End: 2021-10-04
Payer: MEDICARE

## 2021-10-04 PROCEDURE — G2066: CPT

## 2021-10-04 PROCEDURE — 93298 REM INTERROG DEV EVAL SCRMS: CPT

## 2021-11-08 ENCOUNTER — NON-APPOINTMENT (OUTPATIENT)
Age: 86
End: 2021-11-08

## 2021-11-08 ENCOUNTER — APPOINTMENT (OUTPATIENT)
Dept: HEART AND VASCULAR | Facility: CLINIC | Age: 86
End: 2021-11-08
Payer: MEDICARE

## 2021-11-08 PROCEDURE — G2066: CPT | Mod: NC

## 2021-11-08 PROCEDURE — 93298 REM INTERROG DEV EVAL SCRMS: CPT | Mod: NC

## 2021-12-13 ENCOUNTER — NON-APPOINTMENT (OUTPATIENT)
Age: 86
End: 2021-12-13

## 2021-12-13 ENCOUNTER — APPOINTMENT (OUTPATIENT)
Dept: HEART AND VASCULAR | Facility: CLINIC | Age: 86
End: 2021-12-13
Payer: MEDICARE

## 2021-12-13 PROCEDURE — 93298 REM INTERROG DEV EVAL SCRMS: CPT

## 2021-12-13 PROCEDURE — G2066: CPT

## 2022-01-01 ENCOUNTER — NON-APPOINTMENT (OUTPATIENT)
Age: 87
End: 2022-01-01

## 2022-01-01 ENCOUNTER — APPOINTMENT (OUTPATIENT)
Dept: CT IMAGING | Facility: HOSPITAL | Age: 87
End: 2022-01-01

## 2022-01-01 ENCOUNTER — APPOINTMENT (OUTPATIENT)
Dept: NEUROLOGY | Facility: CLINIC | Age: 87
End: 2022-01-01

## 2022-01-01 ENCOUNTER — APPOINTMENT (OUTPATIENT)
Dept: HEART AND VASCULAR | Facility: CLINIC | Age: 87
End: 2022-01-01
Payer: MEDICARE

## 2022-01-01 ENCOUNTER — APPOINTMENT (OUTPATIENT)
Dept: HEART AND VASCULAR | Facility: CLINIC | Age: 87
End: 2022-01-01

## 2022-01-01 ENCOUNTER — OUTPATIENT (OUTPATIENT)
Dept: OUTPATIENT SERVICES | Facility: HOSPITAL | Age: 87
LOS: 1 days | End: 2022-01-01
Payer: MEDICARE

## 2022-01-01 ENCOUNTER — INPATIENT (INPATIENT)
Facility: HOSPITAL | Age: 87
LOS: 2 days | Discharge: HOME | DRG: 65 | End: 2022-09-08
Attending: PSYCHIATRY & NEUROLOGY | Admitting: INTERNAL MEDICINE
Payer: MEDICARE

## 2022-01-01 ENCOUNTER — TRANSCRIPTION ENCOUNTER (OUTPATIENT)
Age: 87
End: 2022-01-01

## 2022-01-01 ENCOUNTER — APPOINTMENT (OUTPATIENT)
Dept: OTOLARYNGOLOGY | Facility: CLINIC | Age: 87
End: 2022-01-01

## 2022-01-01 ENCOUNTER — APPOINTMENT (OUTPATIENT)
Dept: VASCULAR SURGERY | Facility: CLINIC | Age: 87
End: 2022-01-01
Payer: MEDICARE

## 2022-01-01 VITALS
HEART RATE: 65 BPM | HEIGHT: 65 IN | DIASTOLIC BLOOD PRESSURE: 75 MMHG | SYSTOLIC BLOOD PRESSURE: 170 MMHG | BODY MASS INDEX: 19.99 KG/M2 | WEIGHT: 120 LBS | TEMPERATURE: 97.8 F

## 2022-01-01 VITALS
HEART RATE: 67 BPM | TEMPERATURE: 97.6 F | DIASTOLIC BLOOD PRESSURE: 70 MMHG | BODY MASS INDEX: 19.99 KG/M2 | SYSTOLIC BLOOD PRESSURE: 140 MMHG | OXYGEN SATURATION: 97 % | HEIGHT: 65 IN | WEIGHT: 120 LBS | RESPIRATION RATE: 18 BRPM

## 2022-01-01 VITALS
TEMPERATURE: 97.7 F | HEART RATE: 80 BPM | WEIGHT: 139 LBS | BODY MASS INDEX: 23.16 KG/M2 | HEIGHT: 65 IN | DIASTOLIC BLOOD PRESSURE: 86 MMHG | SYSTOLIC BLOOD PRESSURE: 164 MMHG | OXYGEN SATURATION: 99 %

## 2022-01-01 VITALS
SYSTOLIC BLOOD PRESSURE: 165 MMHG | WEIGHT: 125 LBS | HEIGHT: 65 IN | DIASTOLIC BLOOD PRESSURE: 86 MMHG | HEART RATE: 85 BPM | TEMPERATURE: 97.2 F | BODY MASS INDEX: 20.83 KG/M2 | OXYGEN SATURATION: 95 %

## 2022-01-01 VITALS
HEIGHT: 65 IN | HEART RATE: 61 BPM | BODY MASS INDEX: 20.66 KG/M2 | SYSTOLIC BLOOD PRESSURE: 136 MMHG | WEIGHT: 124 LBS | DIASTOLIC BLOOD PRESSURE: 76 MMHG

## 2022-01-01 VITALS
HEIGHT: 65 IN | WEIGHT: 139 LBS | HEART RATE: 71 BPM | BODY MASS INDEX: 23.16 KG/M2 | DIASTOLIC BLOOD PRESSURE: 65 MMHG | SYSTOLIC BLOOD PRESSURE: 144 MMHG

## 2022-01-01 VITALS
TEMPERATURE: 98 F | SYSTOLIC BLOOD PRESSURE: 192 MMHG | DIASTOLIC BLOOD PRESSURE: 89 MMHG | WEIGHT: 119.93 LBS | OXYGEN SATURATION: 98 % | RESPIRATION RATE: 17 BRPM | HEIGHT: 65 IN | HEART RATE: 79 BPM

## 2022-01-01 VITALS
TEMPERATURE: 97.9 F | WEIGHT: 120 LBS | BODY MASS INDEX: 19.99 KG/M2 | HEIGHT: 65 IN | HEART RATE: 59 BPM | SYSTOLIC BLOOD PRESSURE: 150 MMHG | DIASTOLIC BLOOD PRESSURE: 84 MMHG

## 2022-01-01 VITALS
HEART RATE: 67 BPM | OXYGEN SATURATION: 97 % | RESPIRATION RATE: 18 BRPM | SYSTOLIC BLOOD PRESSURE: 159 MMHG | DIASTOLIC BLOOD PRESSURE: 93 MMHG | TEMPERATURE: 98 F

## 2022-01-01 VITALS
DIASTOLIC BLOOD PRESSURE: 82 MMHG | HEIGHT: 65 IN | BODY MASS INDEX: 23.16 KG/M2 | HEART RATE: 74 BPM | SYSTOLIC BLOOD PRESSURE: 132 MMHG | WEIGHT: 139 LBS

## 2022-01-01 DIAGNOSIS — R73.03 PREDIABETES: ICD-10-CM

## 2022-01-01 DIAGNOSIS — I35.0 NONRHEUMATIC AORTIC (VALVE) STENOSIS: ICD-10-CM

## 2022-01-01 DIAGNOSIS — Z86.19 PERSONAL HISTORY OF OTHER INFECTIOUS AND PARASITIC DISEASES: ICD-10-CM

## 2022-01-01 DIAGNOSIS — K21.9 GASTRO-ESOPHAGEAL REFLUX DISEASE WITHOUT ESOPHAGITIS: ICD-10-CM

## 2022-01-01 DIAGNOSIS — I50.32 CHRONIC DIASTOLIC (CONGESTIVE) HEART FAILURE: ICD-10-CM

## 2022-01-01 DIAGNOSIS — Z85.46 PERSONAL HISTORY OF MALIGNANT NEOPLASM OF PROSTATE: ICD-10-CM

## 2022-01-01 DIAGNOSIS — Z87.891 PERSONAL HISTORY OF NICOTINE DEPENDENCE: ICD-10-CM

## 2022-01-01 DIAGNOSIS — Z90.49 ACQUIRED ABSENCE OF OTHER SPECIFIED PARTS OF DIGESTIVE TRACT: Chronic | ICD-10-CM

## 2022-01-01 DIAGNOSIS — R47.1 DYSARTHRIA AND ANARTHRIA: ICD-10-CM

## 2022-01-01 DIAGNOSIS — M51.26 OTHER INTERVERTEBRAL DISC DISPLACEMENT, LUMBAR REGION: ICD-10-CM

## 2022-01-01 DIAGNOSIS — I48.20 CHRONIC ATRIAL FIBRILLATION, UNSPECIFIED: ICD-10-CM

## 2022-01-01 DIAGNOSIS — Z41.9 ENCOUNTER FOR PROCEDURE FOR PURPOSES OTHER THAN REMEDYING HEALTH STATE, UNSPECIFIED: Chronic | ICD-10-CM

## 2022-01-01 DIAGNOSIS — Z79.01 LONG TERM (CURRENT) USE OF ANTICOAGULANTS: ICD-10-CM

## 2022-01-01 DIAGNOSIS — Z90.89 ACQUIRED ABSENCE OF OTHER ORGANS: Chronic | ICD-10-CM

## 2022-01-01 DIAGNOSIS — R41.0 DISORIENTATION, UNSPECIFIED: ICD-10-CM

## 2022-01-01 DIAGNOSIS — I11.0 HYPERTENSIVE HEART DISEASE WITH HEART FAILURE: ICD-10-CM

## 2022-01-01 DIAGNOSIS — R60.0 LOCALIZED EDEMA: ICD-10-CM

## 2022-01-01 DIAGNOSIS — I10 ESSENTIAL (PRIMARY) HYPERTENSION: ICD-10-CM

## 2022-01-01 DIAGNOSIS — I63.9 CEREBRAL INFARCTION, UNSPECIFIED: ICD-10-CM

## 2022-01-01 DIAGNOSIS — Z92.3 PERSONAL HISTORY OF IRRADIATION: ICD-10-CM

## 2022-01-01 DIAGNOSIS — I44.7 LEFT BUNDLE-BRANCH BLOCK, UNSPECIFIED: ICD-10-CM

## 2022-01-01 DIAGNOSIS — R29.810 FACIAL WEAKNESS: ICD-10-CM

## 2022-01-01 DIAGNOSIS — Z86.79 PERSONAL HISTORY OF OTHER DISEASES OF THE CIRCULATORY SYSTEM: ICD-10-CM

## 2022-01-01 DIAGNOSIS — M16.12 UNILATERAL PRIMARY OSTEOARTHRITIS, LEFT HIP: ICD-10-CM

## 2022-01-01 DIAGNOSIS — I48.91 UNSPECIFIED ATRIAL FIBRILLATION: ICD-10-CM

## 2022-01-01 DIAGNOSIS — R29.702 NIHSS SCORE 2: ICD-10-CM

## 2022-01-01 DIAGNOSIS — Z79.82 LONG TERM (CURRENT) USE OF ASPIRIN: ICD-10-CM

## 2022-01-01 LAB
A1C WITH ESTIMATED AVERAGE GLUCOSE RESULT: 5.8 % — HIGH (ref 4–5.6)
ALBUMIN SERPL ELPH-MCNC: 4.4 G/DL — SIGNIFICANT CHANGE UP (ref 3.3–5)
ALP SERPL-CCNC: 67 U/L — SIGNIFICANT CHANGE UP (ref 40–120)
ALT FLD-CCNC: 23 U/L — SIGNIFICANT CHANGE UP (ref 10–45)
ANION GAP SERPL CALC-SCNC: 10 MMOL/L — SIGNIFICANT CHANGE UP (ref 5–17)
ANION GAP SERPL CALC-SCNC: 13 MMOL/L — SIGNIFICANT CHANGE UP (ref 5–17)
ANION GAP SERPL CALC-SCNC: 13 MMOL/L — SIGNIFICANT CHANGE UP (ref 5–17)
APTT BLD: 28.9 SEC — SIGNIFICANT CHANGE UP (ref 27.5–35.5)
AST SERPL-CCNC: 30 U/L — SIGNIFICANT CHANGE UP (ref 10–40)
BASOPHILS # BLD AUTO: 0.04 K/UL — SIGNIFICANT CHANGE UP (ref 0–0.2)
BASOPHILS # BLD AUTO: 0.05 K/UL — SIGNIFICANT CHANGE UP (ref 0–0.2)
BASOPHILS NFR BLD AUTO: 0.4 % — SIGNIFICANT CHANGE UP (ref 0–2)
BASOPHILS NFR BLD AUTO: 0.5 % — SIGNIFICANT CHANGE UP (ref 0–2)
BILIRUB SERPL-MCNC: 0.4 MG/DL — SIGNIFICANT CHANGE UP (ref 0.2–1.2)
BUN SERPL-MCNC: 21 MG/DL — SIGNIFICANT CHANGE UP (ref 7–23)
BUN SERPL-MCNC: 25 MG/DL — HIGH (ref 7–23)
BUN SERPL-MCNC: 26 MG/DL — HIGH (ref 7–23)
CALCIUM SERPL-MCNC: 8.8 MG/DL — SIGNIFICANT CHANGE UP (ref 8.4–10.5)
CALCIUM SERPL-MCNC: 8.8 MG/DL — SIGNIFICANT CHANGE UP (ref 8.4–10.5)
CALCIUM SERPL-MCNC: 9.2 MG/DL — SIGNIFICANT CHANGE UP (ref 8.4–10.5)
CHLORIDE SERPL-SCNC: 100 MMOL/L — SIGNIFICANT CHANGE UP (ref 96–108)
CHLORIDE SERPL-SCNC: 98 MMOL/L — SIGNIFICANT CHANGE UP (ref 96–108)
CHLORIDE SERPL-SCNC: 98 MMOL/L — SIGNIFICANT CHANGE UP (ref 96–108)
CHOLEST SERPL-MCNC: 191 MG/DL — SIGNIFICANT CHANGE UP
CO2 SERPL-SCNC: 22 MMOL/L — SIGNIFICANT CHANGE UP (ref 22–31)
CO2 SERPL-SCNC: 25 MMOL/L — SIGNIFICANT CHANGE UP (ref 22–31)
CO2 SERPL-SCNC: 26 MMOL/L — SIGNIFICANT CHANGE UP (ref 22–31)
CREAT SERPL-MCNC: 1.16 MG/DL — SIGNIFICANT CHANGE UP (ref 0.5–1.3)
CREAT SERPL-MCNC: 1.33 MG/DL — HIGH (ref 0.5–1.3)
CREAT SERPL-MCNC: 1.38 MG/DL — HIGH (ref 0.5–1.3)
EGFR: 50 ML/MIN/1.73M2 — LOW
EGFR: 52 ML/MIN/1.73M2 — LOW
EGFR: 61 ML/MIN/1.73M2 — SIGNIFICANT CHANGE UP
EOSINOPHIL # BLD AUTO: 0.14 K/UL — SIGNIFICANT CHANGE UP (ref 0–0.5)
EOSINOPHIL # BLD AUTO: 0.18 K/UL — SIGNIFICANT CHANGE UP (ref 0–0.5)
EOSINOPHIL NFR BLD AUTO: 1.5 % — SIGNIFICANT CHANGE UP (ref 0–6)
EOSINOPHIL NFR BLD AUTO: 1.7 % — SIGNIFICANT CHANGE UP (ref 0–6)
ESTIMATED AVERAGE GLUCOSE: 120 MG/DL — HIGH (ref 68–114)
GLUCOSE SERPL-MCNC: 111 MG/DL — HIGH (ref 70–99)
GLUCOSE SERPL-MCNC: 91 MG/DL — SIGNIFICANT CHANGE UP (ref 70–99)
GLUCOSE SERPL-MCNC: 99 MG/DL — SIGNIFICANT CHANGE UP (ref 70–99)
HCT VFR BLD CALC: 35.3 % — LOW (ref 39–50)
HCT VFR BLD CALC: 37.3 % — LOW (ref 39–50)
HCT VFR BLD CALC: 38.3 % — LOW (ref 39–50)
HDLC SERPL-MCNC: 61 MG/DL — SIGNIFICANT CHANGE UP
HGB BLD-MCNC: 11.5 G/DL — LOW (ref 13–17)
HGB BLD-MCNC: 12.4 G/DL — LOW (ref 13–17)
HGB BLD-MCNC: 12.9 G/DL — LOW (ref 13–17)
IMM GRANULOCYTES NFR BLD AUTO: 0.3 % — SIGNIFICANT CHANGE UP (ref 0–1.5)
IMM GRANULOCYTES NFR BLD AUTO: 0.4 % — SIGNIFICANT CHANGE UP (ref 0–1.5)
INR BLD: 1.07 — SIGNIFICANT CHANGE UP (ref 0.88–1.16)
LACTATE SERPL-SCNC: 1.1 MMOL/L — SIGNIFICANT CHANGE UP (ref 0.5–2)
LIPID PNL WITH DIRECT LDL SERPL: 117 MG/DL — HIGH
LYMPHOCYTES # BLD AUTO: 1.38 K/UL — SIGNIFICANT CHANGE UP (ref 1–3.3)
LYMPHOCYTES # BLD AUTO: 1.67 K/UL — SIGNIFICANT CHANGE UP (ref 1–3.3)
LYMPHOCYTES # BLD AUTO: 14.5 % — SIGNIFICANT CHANGE UP (ref 13–44)
LYMPHOCYTES # BLD AUTO: 16.1 % — SIGNIFICANT CHANGE UP (ref 13–44)
MAGNESIUM SERPL-MCNC: 2 MG/DL — SIGNIFICANT CHANGE UP (ref 1.6–2.6)
MAGNESIUM SERPL-MCNC: 2.2 MG/DL — SIGNIFICANT CHANGE UP (ref 1.6–2.6)
MCHC RBC-ENTMCNC: 29 PG — SIGNIFICANT CHANGE UP (ref 27–34)
MCHC RBC-ENTMCNC: 29.5 PG — SIGNIFICANT CHANGE UP (ref 27–34)
MCHC RBC-ENTMCNC: 29.9 PG — SIGNIFICANT CHANGE UP (ref 27–34)
MCHC RBC-ENTMCNC: 32.6 GM/DL — SIGNIFICANT CHANGE UP (ref 32–36)
MCHC RBC-ENTMCNC: 33.2 GM/DL — SIGNIFICANT CHANGE UP (ref 32–36)
MCHC RBC-ENTMCNC: 33.7 GM/DL — SIGNIFICANT CHANGE UP (ref 32–36)
MCV RBC AUTO: 87.4 FL — SIGNIFICANT CHANGE UP (ref 80–100)
MCV RBC AUTO: 88.9 FL — SIGNIFICANT CHANGE UP (ref 80–100)
MCV RBC AUTO: 89.9 FL — SIGNIFICANT CHANGE UP (ref 80–100)
MONOCYTES # BLD AUTO: 0.87 K/UL — SIGNIFICANT CHANGE UP (ref 0–0.9)
MONOCYTES # BLD AUTO: 0.96 K/UL — HIGH (ref 0–0.9)
MONOCYTES NFR BLD AUTO: 9.2 % — SIGNIFICANT CHANGE UP (ref 2–14)
MONOCYTES NFR BLD AUTO: 9.2 % — SIGNIFICANT CHANGE UP (ref 2–14)
NEUTROPHILS # BLD AUTO: 7.02 K/UL — SIGNIFICANT CHANGE UP (ref 1.8–7.4)
NEUTROPHILS # BLD AUTO: 7.49 K/UL — HIGH (ref 1.8–7.4)
NEUTROPHILS NFR BLD AUTO: 72.2 % — SIGNIFICANT CHANGE UP (ref 43–77)
NEUTROPHILS NFR BLD AUTO: 74 % — SIGNIFICANT CHANGE UP (ref 43–77)
NON HDL CHOLESTEROL: 130 MG/DL — HIGH
NRBC # BLD: 0 /100 WBCS — SIGNIFICANT CHANGE UP (ref 0–0)
PHOSPHATE SERPL-MCNC: 3.7 MG/DL — SIGNIFICANT CHANGE UP (ref 2.5–4.5)
PHOSPHATE SERPL-MCNC: 4 MG/DL — SIGNIFICANT CHANGE UP (ref 2.5–4.5)
PLATELET # BLD AUTO: 221 K/UL — SIGNIFICANT CHANGE UP (ref 150–400)
PLATELET # BLD AUTO: 223 K/UL — SIGNIFICANT CHANGE UP (ref 150–400)
PLATELET # BLD AUTO: 227 K/UL — SIGNIFICANT CHANGE UP (ref 150–400)
POTASSIUM SERPL-MCNC: 4.2 MMOL/L — SIGNIFICANT CHANGE UP (ref 3.5–5.3)
POTASSIUM SERPL-MCNC: 4.4 MMOL/L — SIGNIFICANT CHANGE UP (ref 3.5–5.3)
POTASSIUM SERPL-MCNC: 5.2 MMOL/L — SIGNIFICANT CHANGE UP (ref 3.5–5.3)
POTASSIUM SERPL-SCNC: 4.2 MMOL/L — SIGNIFICANT CHANGE UP (ref 3.5–5.3)
POTASSIUM SERPL-SCNC: 4.4 MMOL/L — SIGNIFICANT CHANGE UP (ref 3.5–5.3)
POTASSIUM SERPL-SCNC: 5.2 MMOL/L — SIGNIFICANT CHANGE UP (ref 3.5–5.3)
PROT SERPL-MCNC: 7.3 G/DL — SIGNIFICANT CHANGE UP (ref 6–8.3)
PROTHROM AB SERPL-ACNC: 12.7 SEC — SIGNIFICANT CHANGE UP (ref 10.5–13.4)
RBC # BLD: 3.97 M/UL — LOW (ref 4.2–5.8)
RBC # BLD: 4.15 M/UL — LOW (ref 4.2–5.8)
RBC # BLD: 4.38 M/UL — SIGNIFICANT CHANGE UP (ref 4.2–5.8)
RBC # FLD: 13.8 % — SIGNIFICANT CHANGE UP (ref 10.3–14.5)
RBC # FLD: 14 % — SIGNIFICANT CHANGE UP (ref 10.3–14.5)
RBC # FLD: 14 % — SIGNIFICANT CHANGE UP (ref 10.3–14.5)
SARS-COV-2 RNA SPEC QL NAA+PROBE: NEGATIVE — SIGNIFICANT CHANGE UP
SODIUM SERPL-SCNC: 134 MMOL/L — LOW (ref 135–145)
SODIUM SERPL-SCNC: 135 MMOL/L — SIGNIFICANT CHANGE UP (ref 135–145)
SODIUM SERPL-SCNC: 136 MMOL/L — SIGNIFICANT CHANGE UP (ref 135–145)
TRIGL SERPL-MCNC: 64 MG/DL — SIGNIFICANT CHANGE UP
TROPONIN T SERPL-MCNC: 0.01 NG/ML — SIGNIFICANT CHANGE UP (ref 0–0.01)
TSH SERPL-MCNC: 3.75 UIU/ML — SIGNIFICANT CHANGE UP (ref 0.27–4.2)
WBC # BLD: 10.38 K/UL — SIGNIFICANT CHANGE UP (ref 3.8–10.5)
WBC # BLD: 7.65 K/UL — SIGNIFICANT CHANGE UP (ref 3.8–10.5)
WBC # BLD: 9.49 K/UL — SIGNIFICANT CHANGE UP (ref 3.8–10.5)
WBC # FLD AUTO: 10.38 K/UL — SIGNIFICANT CHANGE UP (ref 3.8–10.5)
WBC # FLD AUTO: 7.65 K/UL — SIGNIFICANT CHANGE UP (ref 3.8–10.5)
WBC # FLD AUTO: 9.49 K/UL — SIGNIFICANT CHANGE UP (ref 3.8–10.5)

## 2022-01-01 PROCEDURE — 85610 PROTHROMBIN TIME: CPT

## 2022-01-01 PROCEDURE — 97162 PT EVAL MOD COMPLEX 30 MIN: CPT

## 2022-01-01 PROCEDURE — G2066: CPT

## 2022-01-01 PROCEDURE — 85730 THROMBOPLASTIN TIME PARTIAL: CPT

## 2022-01-01 PROCEDURE — 93306 TTE W/DOPPLER COMPLETE: CPT

## 2022-01-01 PROCEDURE — G1004: CPT

## 2022-01-01 PROCEDURE — 93298 REM INTERROG DEV EVAL SCRMS: CPT

## 2022-01-01 PROCEDURE — 99233 SBSQ HOSP IP/OBS HIGH 50: CPT

## 2022-01-01 PROCEDURE — 85025 COMPLETE CBC W/AUTO DIFF WBC: CPT

## 2022-01-01 PROCEDURE — 93291 INTERROG DEV EVAL SCRMS IP: CPT

## 2022-01-01 PROCEDURE — 80061 LIPID PANEL: CPT

## 2022-01-01 PROCEDURE — 97161 PT EVAL LOW COMPLEX 20 MIN: CPT

## 2022-01-01 PROCEDURE — 83735 ASSAY OF MAGNESIUM: CPT

## 2022-01-01 PROCEDURE — 99213 OFFICE O/P EST LOW 20 MIN: CPT | Mod: 25

## 2022-01-01 PROCEDURE — 93971 EXTREMITY STUDY: CPT

## 2022-01-01 PROCEDURE — 84484 ASSAY OF TROPONIN QUANT: CPT

## 2022-01-01 PROCEDURE — 92522 EVALUATE SPEECH PRODUCTION: CPT | Mod: GN

## 2022-01-01 PROCEDURE — 70450 CT HEAD/BRAIN W/O DYE: CPT | Mod: ME

## 2022-01-01 PROCEDURE — 0042T: CPT | Mod: MA

## 2022-01-01 PROCEDURE — 87635 SARS-COV-2 COVID-19 AMP PRB: CPT

## 2022-01-01 PROCEDURE — 83605 ASSAY OF LACTIC ACID: CPT

## 2022-01-01 PROCEDURE — 99213 OFFICE O/P EST LOW 20 MIN: CPT

## 2022-01-01 PROCEDURE — 36415 COLL VENOUS BLD VENIPUNCTURE: CPT

## 2022-01-01 PROCEDURE — 84443 ASSAY THYROID STIM HORMONE: CPT

## 2022-01-01 PROCEDURE — 70551 MRI BRAIN STEM W/O DYE: CPT | Mod: 26

## 2022-01-01 PROCEDURE — 84100 ASSAY OF PHOSPHORUS: CPT

## 2022-01-01 PROCEDURE — 71045 X-RAY EXAM CHEST 1 VIEW: CPT | Mod: 26

## 2022-01-01 PROCEDURE — 99203 OFFICE O/P NEW LOW 30 MIN: CPT

## 2022-01-01 PROCEDURE — 93010 ELECTROCARDIOGRAM REPORT: CPT

## 2022-01-01 PROCEDURE — 93285 PRGRMG DEV EVAL SCRMS IP: CPT

## 2022-01-01 PROCEDURE — 82962 GLUCOSE BLOOD TEST: CPT

## 2022-01-01 PROCEDURE — 93306 TTE W/DOPPLER COMPLETE: CPT | Mod: 26

## 2022-01-01 PROCEDURE — 99238 HOSP IP/OBS DSCHRG MGMT 30/<: CPT

## 2022-01-01 PROCEDURE — 70496 CT ANGIOGRAPHY HEAD: CPT | Mod: 26,MA

## 2022-01-01 PROCEDURE — 70498 CT ANGIOGRAPHY NECK: CPT | Mod: 26,MA

## 2022-01-01 PROCEDURE — 70450 CT HEAD/BRAIN W/O DYE: CPT | Mod: 26,ME

## 2022-01-01 PROCEDURE — 93000 ELECTROCARDIOGRAM COMPLETE: CPT

## 2022-01-01 PROCEDURE — 93312 ECHO TRANSESOPHAGEAL: CPT | Mod: 26

## 2022-01-01 PROCEDURE — 70450 CT HEAD/BRAIN W/O DYE: CPT | Mod: MA

## 2022-01-01 PROCEDURE — 70551 MRI BRAIN STEM W/O DYE: CPT

## 2022-01-01 PROCEDURE — 99291 CRITICAL CARE FIRST HOUR: CPT

## 2022-01-01 PROCEDURE — 85027 COMPLETE CBC AUTOMATED: CPT

## 2022-01-01 PROCEDURE — 99221 1ST HOSP IP/OBS SF/LOW 40: CPT

## 2022-01-01 PROCEDURE — 71045 X-RAY EXAM CHEST 1 VIEW: CPT

## 2022-01-01 PROCEDURE — 80053 COMPREHEN METABOLIC PANEL: CPT

## 2022-01-01 PROCEDURE — 70498 CT ANGIOGRAPHY NECK: CPT | Mod: MA

## 2022-01-01 PROCEDURE — 70496 CT ANGIOGRAPHY HEAD: CPT | Mod: MA

## 2022-01-01 PROCEDURE — 83036 HEMOGLOBIN GLYCOSYLATED A1C: CPT

## 2022-01-01 PROCEDURE — 80048 BASIC METABOLIC PNL TOTAL CA: CPT

## 2022-01-01 PROCEDURE — 93312 ECHO TRANSESOPHAGEAL: CPT

## 2022-01-01 PROCEDURE — 99212 OFFICE O/P EST SF 10 MIN: CPT

## 2022-01-01 PROCEDURE — 99214 OFFICE O/P EST MOD 30 MIN: CPT

## 2022-01-01 RX ORDER — GABAPENTIN 400 MG/1
2 CAPSULE ORAL
Qty: 0 | Refills: 0 | DISCHARGE

## 2022-01-01 RX ORDER — LABETALOL HCL 100 MG
5 TABLET ORAL ONCE
Refills: 0 | Status: COMPLETED | OUTPATIENT
Start: 2022-01-01 | End: 2022-01-01

## 2022-01-01 RX ORDER — PANTOPRAZOLE SODIUM 20 MG/1
40 TABLET, DELAYED RELEASE ORAL
Refills: 0 | Status: DISCONTINUED | OUTPATIENT
Start: 2022-01-01 | End: 2022-01-01

## 2022-01-01 RX ORDER — MELOXICAM 15 MG/1
1 TABLET ORAL
Qty: 0 | Refills: 0 | DISCHARGE

## 2022-01-01 RX ORDER — APIXABAN 2.5 MG/1
1 TABLET, FILM COATED ORAL
Qty: 0 | Refills: 0 | DISCHARGE

## 2022-01-01 RX ORDER — APIXABAN 2.5 MG/1
2.5 TABLET, FILM COATED ORAL
Qty: 180 | Refills: 1 | Status: DISCONTINUED | COMMUNITY
Start: 2022-01-01 | End: 2022-01-01

## 2022-01-01 RX ORDER — APIXABAN 2.5 MG/1
1 TABLET, FILM COATED ORAL
Qty: 60 | Refills: 0
Start: 2022-01-01 | End: 2022-01-01

## 2022-01-01 RX ORDER — GABAPENTIN 400 MG/1
200 CAPSULE ORAL DAILY
Refills: 0 | Status: DISCONTINUED | OUTPATIENT
Start: 2022-01-01 | End: 2022-01-01

## 2022-01-01 RX ORDER — FERROUS SULFATE 325(65) MG
0 TABLET ORAL
Qty: 0 | Refills: 0 | DISCHARGE

## 2022-01-01 RX ORDER — METOPROLOL TARTRATE 50 MG
1 TABLET ORAL
Qty: 0 | Refills: 0 | DISCHARGE

## 2022-01-01 RX ORDER — SODIUM CHLORIDE 9 MG/ML
1000 INJECTION INTRAMUSCULAR; INTRAVENOUS; SUBCUTANEOUS
Refills: 0 | Status: DISCONTINUED | OUTPATIENT
Start: 2022-01-01 | End: 2022-01-01

## 2022-01-01 RX ORDER — ATORVASTATIN CALCIUM 80 MG/1
1 TABLET, FILM COATED ORAL
Qty: 30 | Refills: 0
Start: 2022-01-01 | End: 2022-01-01

## 2022-01-01 RX ORDER — AMOXICILLIN 500 MG/1
500 CAPSULE ORAL
Qty: 4 | Refills: 2 | Status: DISCONTINUED | COMMUNITY
Start: 2018-08-20 | End: 2022-01-01

## 2022-01-01 RX ORDER — METOPROLOL TARTRATE 50 MG
25 TABLET ORAL ONCE
Refills: 0 | Status: COMPLETED | OUTPATIENT
Start: 2022-01-01 | End: 2022-01-01

## 2022-01-01 RX ORDER — LISINOPRIL/HYDROCHLOROTHIAZIDE 10-12.5 MG
1 TABLET ORAL
Qty: 0 | Refills: 0 | DISCHARGE

## 2022-01-01 RX ORDER — AMLODIPINE BESYLATE 2.5 MG/1
1 TABLET ORAL
Qty: 0 | Refills: 0 | DISCHARGE

## 2022-01-01 RX ORDER — METOPROLOL TARTRATE 50 MG
25 TABLET ORAL DAILY
Refills: 0 | Status: DISCONTINUED | OUTPATIENT
Start: 2022-01-01 | End: 2022-01-01

## 2022-01-01 RX ORDER — APIXABAN 2.5 MG/1
2.5 TABLET, FILM COATED ORAL EVERY 12 HOURS
Refills: 0 | Status: DISCONTINUED | OUTPATIENT
Start: 2022-01-01 | End: 2022-01-01

## 2022-01-01 RX ORDER — LISINOPRIL 2.5 MG/1
20 TABLET ORAL DAILY
Refills: 0 | Status: DISCONTINUED | OUTPATIENT
Start: 2022-01-01 | End: 2022-01-01

## 2022-01-01 RX ORDER — APIXABAN 2.5 MG/1
5 TABLET, FILM COATED ORAL EVERY 12 HOURS
Refills: 0 | Status: DISCONTINUED | OUTPATIENT
Start: 2022-01-01 | End: 2022-01-01

## 2022-01-01 RX ORDER — LISINOPRIL 2.5 MG/1
1 TABLET ORAL
Qty: 0 | Refills: 0 | DISCHARGE

## 2022-01-01 RX ORDER — ATORVASTATIN CALCIUM 40 MG/1
40 TABLET, FILM COATED ORAL
Qty: 90 | Refills: 3 | Status: ACTIVE | COMMUNITY
Start: 2022-01-01 | End: 1900-01-01

## 2022-01-01 RX ORDER — ATORVASTATIN CALCIUM 80 MG/1
80 TABLET, FILM COATED ORAL AT BEDTIME
Refills: 0 | Status: DISCONTINUED | OUTPATIENT
Start: 2022-01-01 | End: 2022-01-01

## 2022-01-01 RX ADMIN — Medication 25 MILLIGRAM(S): at 21:21

## 2022-01-01 RX ADMIN — ATORVASTATIN CALCIUM 80 MILLIGRAM(S): 80 TABLET, FILM COATED ORAL at 21:21

## 2022-01-01 RX ADMIN — Medication 5 MILLIGRAM(S): at 00:32

## 2022-01-01 RX ADMIN — SODIUM CHLORIDE 50 MILLILITER(S): 9 INJECTION INTRAMUSCULAR; INTRAVENOUS; SUBCUTANEOUS at 14:03

## 2022-01-01 RX ADMIN — ATORVASTATIN CALCIUM 80 MILLIGRAM(S): 80 TABLET, FILM COATED ORAL at 21:54

## 2022-01-01 RX ADMIN — PANTOPRAZOLE SODIUM 40 MILLIGRAM(S): 20 TABLET, DELAYED RELEASE ORAL at 07:05

## 2022-01-01 RX ADMIN — Medication 5 MILLIGRAM(S): at 00:56

## 2022-01-01 RX ADMIN — APIXABAN 2.5 MILLIGRAM(S): 2.5 TABLET, FILM COATED ORAL at 07:09

## 2022-01-01 RX ADMIN — ATORVASTATIN CALCIUM 80 MILLIGRAM(S): 80 TABLET, FILM COATED ORAL at 22:01

## 2022-01-01 RX ADMIN — APIXABAN 2.5 MILLIGRAM(S): 2.5 TABLET, FILM COATED ORAL at 05:52

## 2022-01-01 RX ADMIN — PANTOPRAZOLE SODIUM 40 MILLIGRAM(S): 20 TABLET, DELAYED RELEASE ORAL at 05:53

## 2022-01-01 RX ADMIN — GABAPENTIN 200 MILLIGRAM(S): 400 CAPSULE ORAL at 11:13

## 2022-01-01 RX ADMIN — GABAPENTIN 200 MILLIGRAM(S): 400 CAPSULE ORAL at 14:40

## 2022-01-01 RX ADMIN — Medication 25 MILLIGRAM(S): at 11:13

## 2022-01-01 RX ADMIN — Medication 25 MILLIGRAM(S): at 20:25

## 2022-01-01 RX ADMIN — Medication 25 MILLIGRAM(S): at 07:06

## 2022-01-01 RX ADMIN — APIXABAN 2.5 MILLIGRAM(S): 2.5 TABLET, FILM COATED ORAL at 17:26

## 2022-01-01 RX ADMIN — APIXABAN 5 MILLIGRAM(S): 2.5 TABLET, FILM COATED ORAL at 07:05

## 2022-01-01 RX ADMIN — APIXABAN 5 MILLIGRAM(S): 2.5 TABLET, FILM COATED ORAL at 19:42

## 2022-01-01 RX ADMIN — APIXABAN 2.5 MILLIGRAM(S): 2.5 TABLET, FILM COATED ORAL at 22:01

## 2022-01-01 RX ADMIN — LISINOPRIL 20 MILLIGRAM(S): 2.5 TABLET ORAL at 14:40

## 2022-01-18 ENCOUNTER — NON-APPOINTMENT (OUTPATIENT)
Age: 87
End: 2022-01-18

## 2022-01-18 ENCOUNTER — APPOINTMENT (OUTPATIENT)
Dept: HEART AND VASCULAR | Facility: CLINIC | Age: 87
End: 2022-01-18
Payer: MEDICARE

## 2022-01-18 PROCEDURE — G2066: CPT

## 2022-01-18 PROCEDURE — 93298 REM INTERROG DEV EVAL SCRMS: CPT

## 2022-01-21 ENCOUNTER — EMERGENCY (EMERGENCY)
Facility: HOSPITAL | Age: 87
LOS: 1 days | Discharge: HOME | End: 2022-01-21
Attending: STUDENT IN AN ORGANIZED HEALTH CARE EDUCATION/TRAINING PROGRAM | Admitting: STUDENT IN AN ORGANIZED HEALTH CARE EDUCATION/TRAINING PROGRAM
Payer: MEDICARE

## 2022-01-21 VITALS
TEMPERATURE: 98 F | HEIGHT: 65 IN | HEART RATE: 66 BPM | SYSTOLIC BLOOD PRESSURE: 123 MMHG | OXYGEN SATURATION: 97 % | RESPIRATION RATE: 18 BRPM | DIASTOLIC BLOOD PRESSURE: 79 MMHG

## 2022-01-21 VITALS
TEMPERATURE: 98 F | DIASTOLIC BLOOD PRESSURE: 73 MMHG | HEART RATE: 68 BPM | SYSTOLIC BLOOD PRESSURE: 178 MMHG | RESPIRATION RATE: 18 BRPM | OXYGEN SATURATION: 99 %

## 2022-01-21 DIAGNOSIS — Z90.89 ACQUIRED ABSENCE OF OTHER ORGANS: Chronic | ICD-10-CM

## 2022-01-21 DIAGNOSIS — I10 ESSENTIAL (PRIMARY) HYPERTENSION: ICD-10-CM

## 2022-01-21 DIAGNOSIS — R00.2 PALPITATIONS: ICD-10-CM

## 2022-01-21 DIAGNOSIS — I48.0 PAROXYSMAL ATRIAL FIBRILLATION: ICD-10-CM

## 2022-01-21 DIAGNOSIS — Z41.9 ENCOUNTER FOR PROCEDURE FOR PURPOSES OTHER THAN REMEDYING HEALTH STATE, UNSPECIFIED: Chronic | ICD-10-CM

## 2022-01-21 DIAGNOSIS — Z79.82 LONG TERM (CURRENT) USE OF ASPIRIN: ICD-10-CM

## 2022-01-21 DIAGNOSIS — U07.1 COVID-19: ICD-10-CM

## 2022-01-21 DIAGNOSIS — Z90.49 ACQUIRED ABSENCE OF OTHER SPECIFIED PARTS OF DIGESTIVE TRACT: Chronic | ICD-10-CM

## 2022-01-21 DIAGNOSIS — Z79.01 LONG TERM (CURRENT) USE OF ANTICOAGULANTS: ICD-10-CM

## 2022-01-21 DIAGNOSIS — M79.89 OTHER SPECIFIED SOFT TISSUE DISORDERS: ICD-10-CM

## 2022-01-21 DIAGNOSIS — K21.9 GASTRO-ESOPHAGEAL REFLUX DISEASE WITHOUT ESOPHAGITIS: ICD-10-CM

## 2022-01-21 LAB
ALBUMIN SERPL ELPH-MCNC: 4.5 G/DL — SIGNIFICANT CHANGE UP (ref 3.3–5)
ALP SERPL-CCNC: 62 U/L — SIGNIFICANT CHANGE UP (ref 40–120)
ALT FLD-CCNC: 17 U/L — SIGNIFICANT CHANGE UP (ref 10–45)
ANION GAP SERPL CALC-SCNC: 14 MMOL/L — SIGNIFICANT CHANGE UP (ref 5–17)
APTT BLD: 29.6 SEC — SIGNIFICANT CHANGE UP (ref 27.5–35.5)
AST SERPL-CCNC: 26 U/L — SIGNIFICANT CHANGE UP (ref 10–40)
BASOPHILS # BLD AUTO: 0.02 K/UL — SIGNIFICANT CHANGE UP (ref 0–0.2)
BASOPHILS NFR BLD AUTO: 0.2 % — SIGNIFICANT CHANGE UP (ref 0–2)
BILIRUB SERPL-MCNC: 0.5 MG/DL — SIGNIFICANT CHANGE UP (ref 0.2–1.2)
BUN SERPL-MCNC: 19 MG/DL — SIGNIFICANT CHANGE UP (ref 7–23)
CALCIUM SERPL-MCNC: 9.1 MG/DL — SIGNIFICANT CHANGE UP (ref 8.4–10.5)
CHLORIDE SERPL-SCNC: 97 MMOL/L — SIGNIFICANT CHANGE UP (ref 96–108)
CO2 SERPL-SCNC: 25 MMOL/L — SIGNIFICANT CHANGE UP (ref 22–31)
CREAT SERPL-MCNC: 0.95 MG/DL — SIGNIFICANT CHANGE UP (ref 0.5–1.3)
EOSINOPHIL # BLD AUTO: 0.03 K/UL — SIGNIFICANT CHANGE UP (ref 0–0.5)
EOSINOPHIL NFR BLD AUTO: 0.4 % — SIGNIFICANT CHANGE UP (ref 0–6)
GLUCOSE SERPL-MCNC: 125 MG/DL — HIGH (ref 70–99)
HCT VFR BLD CALC: 33.4 % — LOW (ref 39–50)
HGB BLD-MCNC: 10.8 G/DL — LOW (ref 13–17)
IMM GRANULOCYTES NFR BLD AUTO: 0.5 % — SIGNIFICANT CHANGE UP (ref 0–1.5)
INR BLD: 0.98 — SIGNIFICANT CHANGE UP (ref 0.88–1.16)
LYMPHOCYTES # BLD AUTO: 0.59 K/UL — LOW (ref 1–3.3)
LYMPHOCYTES # BLD AUTO: 7.3 % — LOW (ref 13–44)
MCHC RBC-ENTMCNC: 29.2 PG — SIGNIFICANT CHANGE UP (ref 27–34)
MCHC RBC-ENTMCNC: 32.3 GM/DL — SIGNIFICANT CHANGE UP (ref 32–36)
MCV RBC AUTO: 90.3 FL — SIGNIFICANT CHANGE UP (ref 80–100)
MONOCYTES # BLD AUTO: 0.79 K/UL — SIGNIFICANT CHANGE UP (ref 0–0.9)
MONOCYTES NFR BLD AUTO: 9.8 % — SIGNIFICANT CHANGE UP (ref 2–14)
NEUTROPHILS # BLD AUTO: 6.57 K/UL — SIGNIFICANT CHANGE UP (ref 1.8–7.4)
NEUTROPHILS NFR BLD AUTO: 81.8 % — HIGH (ref 43–77)
NRBC # BLD: 0 /100 WBCS — SIGNIFICANT CHANGE UP (ref 0–0)
PLATELET # BLD AUTO: 234 K/UL — SIGNIFICANT CHANGE UP (ref 150–400)
POTASSIUM SERPL-MCNC: 5 MMOL/L — SIGNIFICANT CHANGE UP (ref 3.5–5.3)
POTASSIUM SERPL-SCNC: 5 MMOL/L — SIGNIFICANT CHANGE UP (ref 3.5–5.3)
PROT SERPL-MCNC: 7.1 G/DL — SIGNIFICANT CHANGE UP (ref 6–8.3)
PROTHROM AB SERPL-ACNC: 11.8 SEC — SIGNIFICANT CHANGE UP (ref 10.6–13.6)
RBC # BLD: 3.7 M/UL — LOW (ref 4.2–5.8)
RBC # FLD: 13.2 % — SIGNIFICANT CHANGE UP (ref 10.3–14.5)
SARS-COV-2 RNA SPEC QL NAA+PROBE: DETECTED
SODIUM SERPL-SCNC: 136 MMOL/L — SIGNIFICANT CHANGE UP (ref 135–145)
TROPONIN T SERPL-MCNC: 0.01 NG/ML — SIGNIFICANT CHANGE UP (ref 0–0.01)
WBC # BLD: 8.04 K/UL — SIGNIFICANT CHANGE UP (ref 3.8–10.5)
WBC # FLD AUTO: 8.04 K/UL — SIGNIFICANT CHANGE UP (ref 3.8–10.5)

## 2022-01-21 PROCEDURE — 80053 COMPREHEN METABOLIC PANEL: CPT

## 2022-01-21 PROCEDURE — 93010 ELECTROCARDIOGRAM REPORT: CPT

## 2022-01-21 PROCEDURE — 99285 EMERGENCY DEPT VISIT HI MDM: CPT | Mod: CS

## 2022-01-21 PROCEDURE — 85730 THROMBOPLASTIN TIME PARTIAL: CPT

## 2022-01-21 PROCEDURE — U0005: CPT

## 2022-01-21 PROCEDURE — 36415 COLL VENOUS BLD VENIPUNCTURE: CPT

## 2022-01-21 PROCEDURE — 93971 EXTREMITY STUDY: CPT | Mod: 26,LT

## 2022-01-21 PROCEDURE — 85025 COMPLETE CBC W/AUTO DIFF WBC: CPT

## 2022-01-21 PROCEDURE — 99284 EMERGENCY DEPT VISIT MOD MDM: CPT | Mod: 25

## 2022-01-21 PROCEDURE — U0003: CPT

## 2022-01-21 PROCEDURE — 93005 ELECTROCARDIOGRAM TRACING: CPT

## 2022-01-21 PROCEDURE — 84484 ASSAY OF TROPONIN QUANT: CPT

## 2022-01-21 PROCEDURE — 85610 PROTHROMBIN TIME: CPT

## 2022-01-21 PROCEDURE — 93971 EXTREMITY STUDY: CPT

## 2022-01-21 RX ORDER — CEPHALEXIN 500 MG
1 CAPSULE ORAL
Qty: 10 | Refills: 0
Start: 2022-01-21 | End: 2022-01-25

## 2022-01-21 NOTE — ED ADULT TRIAGE NOTE - CHIEF COMPLAINT QUOTE
85 y/o male c/o palpitations "I feel as if I am in afib" Pt with hx of afib, also c/o foot swelling.

## 2022-01-21 NOTE — ED PROVIDER NOTE - OBJECTIVE STATEMENT
87 yo male with a hx of paroxysmal Afib (not on AC), HTN, OA, prostate ca p/w LLE swelling x months that worsened this morning and palpitations. He was on Eliquis but was discontinued months ago by his cardiologist. Reports the asymmetric LLE swelling has been going on for months and PMD is aware of it. The patietn reports however that it got acutely worse this morning. Denies pain. Able to ambulate without assistance. Denies weakness, numbness or tingling in that leg. Denies any trauma or falls. Denies any ulcers or open wounds. He also reports two episodes of palpitations that lasted about an hour each on Tuesday and Wednesday. Denies dizziness, chest pain, syncope, SOB, abdominal pain, n/v/d/c. Has not had any further episodes since Wednesday.

## 2022-01-21 NOTE — ED PROVIDER NOTE - CLINICAL SUMMARY MEDICAL DECISION MAKING FREE TEXT BOX
87 yo male with a hx of paroxysmal Afib (not on AC), HTN, OA, prostate ca p/w LLE swelling x months that worsened this morning and palpitations. VS WNL. Exam shows LLE edema/warmth/erythema asymmetric compared to other leg highly suspicious for DVT. Will obtain imaging. Pulses equal and strong distally- no concern for arterial pathology. No open wounds- low suspicion for infectious process. Possible venous stasis dermatitis. Two episodes of palpitations that resolved- will work up for electrolyte abnormalities and cardiac injury. EKG does not show arrhythmia- 1st degree AV block unchanged from prior.

## 2022-01-21 NOTE — ED PROVIDER NOTE - PATIENT PORTAL LINK FT
You can access the FollowMyHealth Patient Portal offered by [facility] by registering at the following website: [email or web address]. By joining [employer]’s FollowMyHealth portal, you will also be able to view your health information using other applications (apps) compatible with our system.

## 2022-01-21 NOTE — ED PROVIDER NOTE - PHYSICAL EXAMINATION
VITAL SIGNS: I have reviewed nursing notes and confirm.  CONSTITUTIONAL: Well appearing, in no acute distress.   SKIN:  warm and dry, no acute rash.   HEAD:  normocephalic, atraumatic.  EYES: EOM intact; conjunctiva and sclera clear.  ENT: No nasal discharge; airway clear.   NECK: Supple; non tender.  CARD: S1, S2 normal; no murmurs, gallops, or rubs. Regular rate and rhythm.   RESP:  Clear to auscultation b/l, no wheezes, rales or rhonchi.  ABD: Normal bowel sounds; soft; non-distended; non-tender; no guarding/ rebound.  EXT: Normal ROM. No clubbing or cyanosis. 2+ pulses to b/l ue/le. +LLE 2+ pitting edema with erythema/warmth up to knee. No calf ttp. No open wounds or ulcers.   NEURO: Alert, oriented, grossly unremarkable  PSYCH: Cooperative, mood and affect appropriate.

## 2022-01-21 NOTE — ED ADULT NURSE NOTE - NSIMPLEMENTINTERV_GEN_ALL_ED
Implemented All Fall with Harm Risk Interventions:  [place] to call system. Call bell, personal items and telephone within reach. Instruct patient to call for assistance. Room bathroom lighting operational. Non-slip footwear when patient is off stretcher. Physically safe environment: no spills, clutter or unnecessary equipment. Stretcher in lowest position, wheels locked, appropriate side rails in place. Provide visual cue, wrist band, yellow gown, etc. Monitor gait and stability. Monitor for mental status changes and reorient to person, place, and time. Review medications for side effects contributing to fall risk. Reinforce activity limits and safety measures with patient and family. Provide visual clues: red socks.

## 2022-01-21 NOTE — ED PROVIDER NOTE - NSICDXPASTMEDICALHX_GEN_ALL_CORE_FT
PAST MEDICAL HISTORY:  Aortic stenosis     GERD (gastroesophageal reflux disease)     Hepatitis B    Hypertension     Lumbar herniated disc     OA (osteoarthritis)     Prostate cancer radiation treatment

## 2022-01-21 NOTE — ED ADULT NURSE NOTE - OBJECTIVE STATEMENT
Pt presented with chief complaints of LLE swelling/redness and palpitations . AOX4, speaking full sentences. Patient denies chest pain, shortness of breath, difficulty breathing and any form of distress not noted.  Chest rise equal and symmetrical bilaterally, trachea midline, resps even and nonlabored. AMoves all extremities.  Edema noted in LLE. Patient oriented to ED area. All needs attended. POC reviewed. Placed on continuos cardiac monitoring. Rounding in progress. Fall risk precautions maintained.

## 2022-01-21 NOTE — ED PROVIDER NOTE - CARE PROVIDER_API CALL
Layo Rodarte)  Internal Medicine  [address] [place] [address], [address] ([facility] [place] [address]  Phone: [phone]  Fax: [phone]  Follow Up Time:

## 2022-01-21 NOTE — ED PROVIDER NOTE - NSICDXPASTSURGICALHX_GEN_ALL_CORE_FT
PAST SURGICAL HISTORY:  S/P appendectomy     S/P tonsillectomy     Surgery, elective Shoulder surgery

## 2022-01-21 NOTE — ED ADULT NURSE REASSESSMENT NOTE - NS ED NURSE REASSESS COMMENT FT1
Pt BP elevated. Pt asymptomatic.  PA notified.  Denies any pain or discomfort. States "I will take my BP meds and follow up with doctor tomorrow".

## 2022-01-21 NOTE — ED PROVIDER NOTE - PROGRESS NOTE DETAILS
Discussed with patient's PCP Dr. Layo Rodarte [phone] Attempted to call PMD to update on status. Unable to reach him. Will discharge patient with follow up instructions. Venous duplex negative. Labs unremarkable. No indication for inpatient care at this time.

## 2022-01-21 NOTE — ED PROVIDER NOTE - CARE PLAN
Assessment and plan of treatment:	87 yo male with a hx of paroxysmal Afib (not on AC), HTN, OA, prostate ca p/w LLE swelling x months that worsened this morning and palpitations. VS WNL. Exam shows LLE edema/warmth/erythema asymmetric compared to other leg highly suspicious for DVT. Will obtain imaging. Pulses equal and strong distally- no concern for arterial pathology. No open wounds- low suspicion for infectious process. Possible venous stasis dermatitis. Two episodes of palpitations that resolved- will work up for electrolyte abnormalities and cardiac injury. EKG does not show arrhythmia- 1st degree AV block unchanged from prior.   1 Principal Discharge DX:	Leg swelling

## 2022-01-26 ENCOUNTER — FORM ENCOUNTER (OUTPATIENT)
Age: 87
End: 2022-01-26

## 2022-02-03 NOTE — DISCUSSION/SUMMARY
[FreeTextEntry1] : PAF, back on doac but still a fall risk\par will d/w EP possibility of watchman\par on doac for now\par see me back in a month

## 2022-02-03 NOTE — HISTORY OF PRESENT ILLNESS
[FreeTextEntry1] : having freq PAF episodes\par back on doac\par daily, up to an hour\par not lightheaded, no falls since last feb\par no cp or dyspnea w the AF\par stable functional capacity\par also having LLE edema- seen in ER- No DVT\par has associated nodular, erythematous rash also on the LLE\par seen by derm, bx pending\par Has appt w EP later today\par

## 2022-02-10 PROBLEM — Z86.79 HISTORY OF ATRIAL FIBRILLATION: Status: RESOLVED | Noted: 2022-01-01 | Resolved: 2022-01-01

## 2022-02-10 PROBLEM — Z87.891 FORMER SMOKER: Status: ACTIVE | Noted: 2022-01-01

## 2022-02-10 PROBLEM — Z86.79 HISTORY OF AORTIC VALVE DISORDER: Status: RESOLVED | Noted: 2022-01-01 | Resolved: 2022-01-01

## 2022-02-10 PROBLEM — R60.0 LEG EDEMA, LEFT: Status: ACTIVE | Noted: 2022-01-01

## 2022-02-11 NOTE — PHYSICAL EXAM
[Normal Breath Sounds] : Normal breath sounds [Respiratory Effort] : normal respiratory effort [Normal Heart Sounds] : normal heart sounds [2+] : left 2+ [Ankle Swelling (On Exam)] : present [Ankle Swelling On The Left] : of the left ankle [No Rash or Lesion] : No rash or lesion [JVD] : no jugular venous distention  [Skin Ulcer] : no ulcer [de-identified] : No acute distress [de-identified] : 2+ pitting edema in left leg extending below the knee to ankle

## 2022-02-11 NOTE — DISCUSSION/SUMMARY
[FreeTextEntry1] : 86 year old male with HTN, history of prostate cancer 10 years ago s/p radiation, chronic diastolic heart failure with AS s/p TAVR 2018 with LBBB s/p EP study with no need for PPM and atrial fibrillation with ILR, who presents for follow up.  ILR incision well healed.  Interrogation reveals 2% afib but burden has increased recently.  He is back on Eliquis and Metoprolol.  Mild symptoms with afib.  We discussed what atrial fibrillation is and the association with stroke.  CHADSVASC score is at least 3 and he is back on low dose ELiquis.  We discussed options including continuing eliquis, versus Watchman and he is interested in continuing the eliquis as he has not had problems with falling lately so he wants to try and seeow he does.

## 2022-02-11 NOTE — HISTORY OF PRESENT ILLNESS
[de-identified] : 86 year old male with HTN, history of prostate cancer 10 years ago s/p radiation, chronic diastolic heart failure with AS s/p TAVR 2018 with LBBB s/p EP study with no need for PPM and atrial fibrillation with ILR, who presents for follow up.\par \par He is on Eliquis for atrial fibrillation.  He has had multiple falls and eliquis was stopped.  He was referred for a WATCHMAN procedure but wanted to confirm atrial fibrillation first and underwent an ILR implant.  He has no device related complaints.  No palpitations, chest pain, SOB, syncope, near syncope, orthopnea, PND.  He has had recurrent afib and is back on Eliquis.

## 2022-02-11 NOTE — ASSESSMENT
[FreeTextEntry1] : 87 yo male with PMH HTN, HFpEF, AS s/p TAVR (2018), ILR implant 2018, LBBB, Afib (on eliquis), L hip arthritis and  prostate cancer s/p resection 10 yrs ago who presents from referral by PCP for evaluation of left leg swelling. 3 weeks ago. He went to [facility] ED, duplex US was found to be negative, and he was found to be COVID + and back in afib and restarted on Eliquis, He denies any pain or skin changes and repeat duplex US done today in office is negative for DVT and just shows soft tissue swelling. Left leg swelling not likely vascular in etiology, recommend compression and elevation and follow up if swelling worsens.

## 2022-02-11 NOTE — ADDENDUM
[FreeTextEntry1] : I, Johnny Barboza, hereby attest that the medical record entry for this patient accurately reflects signatures/notations that I made on the Date of Service in my capacity as an Attending Physician when I treated/diagnosed the above patient. I do hereby attest that this information is true, accurate and complete to the best of my knowledge and I understand that any falsification, omission, or concealment of material fact may subject me to administrative, civil, or, criminal liability. I agree with the note as written by my PA in its entirety.\par I was present for the entire visit and supervised the entire visit and agree with the plan as outlined.\par \par

## 2022-02-11 NOTE — REVIEW OF SYSTEMS
[Negative] : Heme/Lymph [Palpitations] : palpitations [Fever] : no fever [Weight Gain (___ Lbs)] : no recent weight gain [Chills] : no chills [Feeling Fatigued] : not feeling fatigued [Weight Loss (___ Lbs)] : no recent weight loss [SOB] : no shortness of breath [Dyspnea on exertion] : not dyspnea during exertion [Chest Discomfort] : no chest discomfort [Syncope] : no syncope

## 2022-02-11 NOTE — END OF VISIT
[FreeTextEntry3] : I, Dr. Lili Haley  have read and attest that all the information, medical decision making and discharge instructions within are true and accurate, I  personally performed the evaluation and management (E/M) services for this new patient.  That E/M includes conducting the initial examination, assessing all conditions, and establishing the plan of care.  Today, my ACP, Nayana Barber PA-C, was here to observe my evaluation and management services for this patient to be followed going forward.\par

## 2022-02-11 NOTE — HISTORY OF PRESENT ILLNESS
[FreeTextEntry1] : 85 yo male with PMH HTN, HFpEF, AS s/p TAVR (2018), ILR implant 2018, LBBB, Afib (on eliquis), L hip arthritis and  prostate cancer s/p resection 10 yrs ago who presents from referral by PCP for evaluation of left leg swelling. 3 weeks ago, 3 days after returning from a flight from [place] he noticed his left leg became more swollen than the right, he went to [facility] ED where duplex US was performed that was negative for any DVT, his ILR showed that he was back on afib and so he was restarted on Eliquis 2.5. He was also COVID + in ED but remained asymptomatic. The swelling has persisted since then, but he denies any pain or skin changes. No family history of DVT.

## 2022-02-11 NOTE — PHYSICAL EXAM
[General Appearance - Well Developed] : well developed [Normal Appearance] : normal appearance [Well Groomed] : well groomed [General Appearance - Well Nourished] : well nourished [No Deformities] : no deformities [General Appearance - In No Acute Distress] : no acute distress [Heart Rate And Rhythm] : heart rate and rhythm were normal [Heart Sounds] : normal S1 and S2 [] : no respiratory distress [Respiration, Rhythm And Depth] : normal respiratory rhythm and effort [Exaggerated Use Of Accessory Muscles For Inspiration] : no accessory muscle use [Auscultation Breath Sounds / Voice Sounds] : lungs were clear to auscultation bilaterally [Clean] : clean [Dry] : dry [Well-Healed] : well-healed [Palpable Crepitus] : no palpable crepitus [Bleeding] : no active bleeding [Foul Odor] : no foul smell [Purulent Drainage] : no purulent drainage [Serosanguineous Drainage] : no serosanquineous drainage [Serous Drainage] : no serous drainage [Erythema] : not erythematous [Warm] : not warm [Tender] : not tender [Indurated] : not indurated [Fluctuant] : not fluctuant

## 2022-02-11 NOTE — PROCEDURE
[de-identified] : [employer] REVEAL LINQ\par [id number]\par battery good\par sensing good\par afib 2% short and mostly rate controlled

## 2022-03-23 NOTE — REVIEW OF SYSTEMS
[Palpitations] : palpitations [Negative] : Heme/Lymph [Fever] : no fever [Weight Gain (___ Lbs)] : no recent weight gain [Chills] : no chills [Feeling Fatigued] : not feeling fatigued [Weight Loss (___ Lbs)] : no recent weight loss [SOB] : no shortness of breath [Dyspnea on exertion] : not dyspnea during exertion [Chest Discomfort] : no chest discomfort [Syncope] : no syncope

## 2022-03-23 NOTE — HISTORY OF PRESENT ILLNESS
[de-identified] : 86 year old male with HTN, history of prostate cancer 10 years ago s/p radiation, chronic diastolic heart failure with AS s/p TAVR 2018 with LBBB s/p EP study with no need for PPM and atrial fibrillation with ILR, who presents for follow up.\par \par He is on Eliquis for atrial fibrillation.  He has had multiple falls and eliquis was stopped.  He was referred for a WATCHMAN procedure but wanted to confirm atrial fibrillation first and underwent an ILR implant.  He has no device related complaints.  No palpitations, chest pain, SOB, syncope, near syncope, orthopnea, PND. \par \par He has had recurrent afib and is back on Eliquis. He is tolerating eliquis. Has some palpitations but otherwise feels fine.

## 2022-03-23 NOTE — PROCEDURE
[de-identified] : [employer] REVEAL LINQ II -- implanted in 6/21/2021. \par [id number]\par battery good\par sensing good\par afib 7.4% mostly rate controlled

## 2022-05-11 PROBLEM — I48.91 ATRIAL FIBRILLATION: Status: ACTIVE | Noted: 2018-07-16

## 2022-05-11 PROBLEM — R41.0 EPISODIC CONFUSION: Status: ACTIVE | Noted: 2022-01-01

## 2022-05-11 NOTE — HISTORY OF PRESENT ILLNESS
[FreeTextEntry1] : f/u PAF\par not lightheaded, no falls since last feb\par no cp or dyspnea w the AF\par stable functional capacity\par had episode of confusion/disorientation today\par lasted 15mins\par no focal neuro deficits\par feels at baseline now\par

## 2022-06-30 NOTE — PROCEDURE
[de-identified] : [employer] REVEAL LINQ II -- implanted in 6/21/2021. \par [id number]\par battery good\par sensing good\par afib 7.4% mostly rate controlled

## 2022-06-30 NOTE — CARDIOLOGY SUMMARY
[de-identified] : 6/11/2011: LVEF 63%. Borderline LVH. Moderate LA enlargement.  s/p transcatheter AVR (bioprosthetic). No pericardial effusion.

## 2022-06-30 NOTE — HISTORY OF PRESENT ILLNESS
[de-identified] : 86 year old male with HTN, history of prostate cancer 10 years ago s/p radiation, chronic diastolic heart failure with AS s/p TAVR 2018 with LBBB s/p EP study with no need for PPM and atrial fibrillation with ILR, who presents for follow up.\par He has had falls in the past.  He is on eliquis and is tolerating it.  Watchman procedure was discussed in the past in setting of multiple falls but since the past year or so he has been doing fine without falls.  No syncope. C/O some dizziness [place]  \par Regarding his AFIB, he doesn't feel it and not aware of any palpitations.  He presents in AFIB today with rate controlled.  He uses Remote Home Monitor and has been notified by us recently for an episode of 4sec pause on 5/4 during sleeping hour.  he has another one on 6/20 for about 3sec also during sleeping time. \par \par

## 2022-06-30 NOTE — PHYSICAL EXAM
[General Appearance - Well Developed] : well developed [Normal Appearance] : normal appearance [Well Groomed] : well groomed [General Appearance - Well Nourished] : well nourished [No Deformities] : no deformities [General Appearance - In No Acute Distress] : no acute distress [Heart Sounds] : normal S1 and S2 [] : no respiratory distress [Respiration, Rhythm And Depth] : normal respiratory rhythm and effort [Exaggerated Use Of Accessory Muscles For Inspiration] : no accessory muscle use [Auscultation Breath Sounds / Voice Sounds] : lungs were clear to auscultation bilaterally [Clean] : clean [Dry] : dry [Well-Healed] : well-healed [Arterial Pulses Normal] : the arterial pulses were normal [FreeTextEntry1] : irregular, +systolic murmur 2/6. Mild LE edema pitting.  [Palpable Crepitus] : no palpable crepitus [Bleeding] : no active bleeding [Foul Odor] : no foul smell [Purulent Drainage] : no purulent drainage [Serosanguineous Drainage] : no serosanquineous drainage [Serous Drainage] : no serous drainage [Erythema] : not erythematous [Warm] : not warm [Tender] : not tender [Indurated] : not indurated [Fluctuant] : not fluctuant

## 2022-06-30 NOTE — REVIEW OF SYSTEMS
[Lower Ext Edema] : lower extremity edema [Negative] : Gastrointestinal [Fever] : no fever [Weight Gain (___ Lbs)] : no recent weight gain [Chills] : no chills [Feeling Fatigued] : not feeling fatigued [Weight Loss (___ Lbs)] : no recent weight loss [SOB] : no shortness of breath [Dyspnea on exertion] : not dyspnea during exertion [Chest Discomfort] : no chest discomfort [Palpitations] : no palpitations [Syncope] : no syncope

## 2022-06-30 NOTE — DISCUSSION/SUMMARY
[FreeTextEntry1] : 86 year old male with HTN, AS s/p TAVR (2018), LBBB, s/p EP study with no need for PPM, and paroxysmal atrial fibrillation with ILR to help with AFIB burden assessment. \par 1-- AFIB:  CHADSVASC score is at least 3 and he is back on low dose ELiquis and tolerates it. Unclear if his symptoms of occasional dizziness is related to AFIB.  His AFIB burden is increased on ILR, but mostly rate controlled.  We explain that if more symptoms when in AFIB RVR, then need meds to control it and may consider ppm to control any shravan. Also, we asked him to keep a log of his symptoms when he feels either dizziness not related to positional change, in order for us to make assessment if symptoms are correlate to afib. \par f/u 3 months.  \par

## 2022-09-05 NOTE — H&P ADULT - NSHPLABSRESULTS_GEN_ALL_CORE
Fingerstick Blood Glucose: CAPILLARY BLOOD GLUCOSE  115 (05 Sep 2022 19:13)      POCT Blood Glucose.: 115 mg/dL (05 Sep 2022 18:57)    LABS:                        12.4   9.49  )-----------( 227      ( 05 Sep 2022 19:05 )             37.3     09-05    136  |  98  |  x   ----------------------------<  111<H>  5.2   |  25  |  x     Ca    9.2      05 Sep 2022 19:05      PT/INR - ( 05 Sep 2022 19:05 )   PT: 12.7 sec;   INR: 1.07          PTT - ( 05 Sep 2022 19:05 )  PTT:28.9 sec  CARDIAC MARKERS ( 05 Sep 2022 19:05 )  x     / 0.01 ng/mL / x     / x     / x              RADIOLOGY & ADDITIONAL STUDIES:    HCT: no acute intracranial hemorrhage or transcortical infarction.     CTA H/N: no hemodynamically significant large vessel extracranial stenosis or occlusion.

## 2022-09-05 NOTE — H&P ADULT - HISTORY OF PRESENT ILLNESS
**STROKE HPI***    HPI: 87y Male with PMHx of HTN (lisinopril 20mg), afib (on eliquis 2.5mg BID, compliant), prostate CA s/p radiation 10 years ago, anemia (ferrous sulfate 325mg), Hepatitis B (treated), left hip arthritis, chronic diastolic heart failure with severe aortic stenosis s/p TAVR in 2018 with LBBB (metoprolol succinate ER 25mg daily) coming in with 5 minutes of dysphagia, dysarthria, and questionable R facial droop witnessed by friend vs caretaker which started at 6pm. CTH       PAST MEDICAL & SURGICAL HISTORY:  Hypertension      Aortic stenosis      OA (osteoarthritis)      Hepatitis  B      Lumbar herniated disc      Prostate cancer  radiation treatment      GERD (gastroesophageal reflux disease)      S/P appendectomy      S/P tonsillectomy      Surgery, elective  Shoulder surgery          FAMILY HISTORY:      SOCIAL HISTORY:   Patient lives with friend/caretaker  Smoking status: denies  Drinking: occasional glasses of wine  Drug Use: denies    ROS:   see HPI      T(C): 36.7 (09-05-22 @ 18:58), Max: 36.7 (09-05-22 @ 18:58)  HR: 82 (09-05-22 @ 19:25) (79 - 95)  BP: 175/100 (09-05-22 @ 19:25) (154/100 - 192/89)  RR: 18 (09-05-22 @ 19:25) (17 - 18)  SpO2: 94% (09-05-22 @ 19:20) (94% - 98%)    MEDICATION RECONCILIATION   MEDICATIONS  (STANDING):    MEDICATIONS  (PRN):    Allergies    No Known Allergies    Intolerances      Vital Signs Last 24 Hrs  T(C): 36.7 (05 Sep 2022 18:58), Max: 36.7 (05 Sep 2022 18:58)  T(F): 98.1 (05 Sep 2022 18:58), Max: 98.1 (05 Sep 2022 18:58)  HR: 82 (05 Sep 2022 19:25) (79 - 95)  BP: 175/100 (05 Sep 2022 19:25) (154/100 - 192/89)  BP(mean): --  RR: 18 (05 Sep 2022 19:25) (17 - 18)  SpO2: 94% (05 Sep 2022 19:20) (94% - 98%)    Parameters below as of 05 Sep 2022 19:20  Patient On (Oxygen Delivery Method): room air        Physical exam:  Neurologic:  -Mental status: Awake, alert, oriented to person, place, and time. Speech is fluent with intact naming, repetition, and comprehension, no dysarthria. Recent and remote memory intact. Follows commands. Attention/concentration intact. Fund of knowledge appropriate.  -Cranial nerves:   II: Visual fields are full to confrontation.  III, IV, VI: Extraocular movements are intact without nystagmus. Pupils equally round and reactive to light  V:  Facial sensation V1-V3 equal and intact   VII: face appears asymmetric with left nasolabial fold flattening  VIII: Hearing is bilaterally intact to finger rub  IX, X: Uvula is midline and soft palate rises symmetrically  XI: hard of hearing at baseline with hearing aids  XII: Tongue protrudes midline  Motor: Normal bulk and tone. RUE subtle pronator drift. otherwise strength bilateral upper extremity 5/5, bilateral lower extremities 5/5.  Rapid alternating movements intact and symmetric  Sensation: Intact to light touch bilaterally. No neglect or extinction on double simultaneous testing.  Coordination: LUE past pointing couple of times on L FNF  Reflexes: L toes downgoing. R toes patient was withdrawing so unable to test  Gait: Narrow gait and steady    NIHSS: 2 ASPECT Score: *** ICH Score: *** (GCS)    Fingerstick Blood Glucose: CAPILLARY BLOOD GLUCOSE  115 (05 Sep 2022 19:13)      POCT Blood Glucose.: 115 mg/dL (05 Sep 2022 18:57)    LABS:                        12.4   9.49  )-----------( 227      ( 05 Sep 2022 19:05 )             37.3     09-05    136  |  98  |  x   ----------------------------<  111<H>  5.2   |  25  |  x     Ca    9.2      05 Sep 2022 19:05      PT/INR - ( 05 Sep 2022 19:05 )   PT: 12.7 sec;   INR: 1.07          PTT - ( 05 Sep 2022 19:05 )  PTT:28.9 sec  CARDIAC MARKERS ( 05 Sep 2022 19:05 )  x     / 0.01 ng/mL / x     / x     / x              RADIOLOGY & ADDITIONAL STUDIES:    HCT:     CTA:    -----------------------------------------------------------------------------------------    ASSESSMENT/PLAN:    87y Male w/ PMH ***. HCT showed ***. CTA showed ***. Given *** tPA was ***. Patient was admitted to **** for further workup    **STROKE HPI***    HPI: 87y Male with PMHx of HTN (lisinopril 20mg), afib (eliquis 2.5mg BID), prostate CA s/p radiation, anemia (ferrous sulfate 325mg), Hepatitis B (treated), left hip arthritis, chronic diastolic HF with severe AS s/p TAVR in 2018 with LBBB (metoprolol succinate ER 25mg daily) coming in with 5 minutes of dysphagia, dysarthria, and questionable R facial droop witnessed by friend/caretaker which started at 6pm. States when the patient tried to drink water, he could not swallow and the water fell out of his mouth. Passed dysphagia screening in ED. CTH shows no acute intracranial hemorrhage or transcortical infarction. CTP negative for perfusion deficit. CTA H/N shows no hemodynamically significant large vessel extracranial stenosis or occlusion.   Caretaker/friend states patient is religiously complaint with his meds.       PAST MEDICAL & SURGICAL HISTORY:  Hypertension      Aortic stenosis      OA (osteoarthritis)      Hepatitis  B      Lumbar herniated disc      Prostate cancer  radiation treatment      GERD (gastroesophageal reflux disease)      S/P appendectomy      S/P tonsillectomy      Surgery, elective  Shoulder surgery          FAMILY HISTORY:    T(C): 36.7 (09-05-22 @ 18:58), Max: 36.7 (09-05-22 @ 18:58)  HR: 82 (09-05-22 @ 19:25) (79 - 95)  BP: 175/100 (09-05-22 @ 19:25) (154/100 - 192/89)  RR: 18 (09-05-22 @ 19:25) (17 - 18)  SpO2: 94% (09-05-22 @ 19:20) (94% - 98%)    MEDICATION RECONCILIATION   MEDICATIONS  (STANDING):    MEDICATIONS  (PRN):    Allergies    No Known Allergies    Intolerances      Vital Signs Last 24 Hrs  T(C): 36.7 (05 Sep 2022 18:58), Max: 36.7 (05 Sep 2022 18:58)  T(F): 98.1 (05 Sep 2022 18:58), Max: 98.1 (05 Sep 2022 18:58)  HR: 82 (05 Sep 2022 19:25) (79 - 95)  BP: 175/100 (05 Sep 2022 19:25) (154/100 - 192/89)  BP(mean): --  RR: 18 (05 Sep 2022 19:25) (17 - 18)  SpO2: 94% (05 Sep 2022 19:20) (94% - 98%)    Parameters below as of 05 Sep 2022 19:20  Patient On (Oxygen Delivery Method): room air

## 2022-09-05 NOTE — ED PROVIDER NOTE - PHYSICAL EXAMINATION
065
CONST: nontoxic NAD speaking in full sentences  HEAD: atraumatic  EYES: conjunctivae clear  ENT: mmm  NECK: supple/FROM, nttp, no jvd  CARD: irreg irregno murmurs  CHEST: ctab no r/r/w  ABD: soft, nd, nttp, no rebound/guarding  EXT: FROM, symmetric distal pulses intact  SKIN: warm, dry, no rash, no pedal edema/ttp/rash, cap refill <2sec  NEURO: a+ox3, no facial asymmetry, no aphasia, PERRL, EOMI, no neglect, CN II-XII intact, neg pronator, 5/5 strength x4, gross sensation intact x4

## 2022-09-05 NOTE — H&P ADULT - NSHPSOCIALHISTORY_GEN_ALL_CORE
Patient lives with friend/caretaker  Smoking status: denies  Drinking: occasional glasses of wine  Drug Use: denies

## 2022-09-05 NOTE — ED PROVIDER NOTE - CLINICAL SUMMARY MEDICAL DECISION MAKING FREE TEXT BOX
stroke code upon arrival. fs wnl. last seen nl 6p today. resolved R facial droop/slurred speech. no leukocytosis vs significant anemia vs electrolyte abnl. trop wnl. found to have afib rate-controlled on ekg w/o acute ischemic changes. cxr w/o acute focal consol vs ptx vs pulm edema vs widened mediastinum. ct/cta w/o acute abnl. will give extra home dose mtp given sbp 180s and hr 90s. stroke agrees and reccs for admission. no meds at this time. will admit per reccs.

## 2022-09-05 NOTE — ED ADULT TRIAGE NOTE - CHIEF COMPLAINT QUOTE
Patient's friend called ems because he noticed patient started slurring his speech and had right facial droop that lasted for 5 mins onset 45 minutes prior to arrival to ED.  Chrissy Dennisib currently on eliquis

## 2022-09-05 NOTE — H&P ADULT - ASSESSMENT
87y Male with PMHx of HTN (lisinopril 20mg), afib (eliquis 2.5mg BID), prostate CA s/p radiation, anemia (ferrous sulfate 325mg), Hepatitis B (treated), left hip arthritis, chronic diastolic HF with severe AS s/p TAVR in 2018 with LBBB (metoprolol succinate ER 25mg daily) coming in with 5 minutes of dysphagia, dysarthria, and questionable R facial droop witnessed by friend/caretaker which started at 6pm. CTH shows no acute intracranial hemorrhage or transcortical infarction. CTP negative for perfusion deficit. CTA H/N shows no hemodynamically significant large vessel extracranial stenosis or occlusion. NIHSS 2. Admitted for further stroke w/u    Neuro  #CVA workup  - continue eliquis 2.5mg BID  - continue atorvastatin 80mg daily  - q4hr stroke neuro checks and vitals  - obtain MRI Brain without contrast  - Stroke Code HCT Results: neg  - Stroke Code CTA Results: neg  - Stroke education    Cards  #HTN  - permissive hypertension, Goal -180  - hold home blood pressure medication for now  - obtain TTE with bubble  - Stroke Code EKG Results: f/u    #HLD  - high dose statin as above in CVA  - LDL results: pending    Pulm  - call provider if SPO2 < 94%    GI  #Nutrition/Fluids/Electrolytes   - replete K<4 and Mg <2  - Diet: reg  - IVF: none indicated at this time    Renal  - trend BMP    Infectious Disease  - Stroke Code CXR results: f/u    Endocrine  - A1C results: pending  - TSH results: pending    DVT Prophylaxis  - lovenox sq for DVT prophylaxis   - SCDs for DVT prophylaxis       Dispo: admitted to stroke  Discussed daily hospital plans and goals with patient and friend/caretaker at bedside    Discussed with Neurology Attending Dr. Lowe 87y Male with PMHx of HTN (lisinopril 20mg), afib (eliquis 2.5mg BID), prostate CA s/p radiation, anemia (ferrous sulfate 325mg), Hepatitis B (treated), left hip arthritis, chronic diastolic HF with severe AS s/p TAVR in 2018 with LBBB (metoprolol succinate ER 25mg daily) coming in with 5 minutes of dysphagia, dysarthria, and questionable R facial droop witnessed by friend/caretaker which started at 6pm. CTH shows no acute intracranial hemorrhage or transcortical infarction. CTP negative for perfusion deficit. CTA H/N shows no hemodynamically significant large vessel extracranial stenosis or occlusion. NIHSS 2. Admitted for further stroke w/u    Neuro  #CVA workup  - continue eliquis 2.5mg BID  - continue atorvastatin 80mg daily  - q4hr stroke neuro checks and vitals  - obtain MRI Brain without contrast  - Stroke Code HCT Results: neg  - Stroke Code CTA Results: neg  - Stroke education    Cards  #HTN  - permissive hypertension, Goal -180  - hold home blood pressure medication for now  - obtain TTE with bubble  - Stroke Code EKG Results: f/u    #HLD  - high dose statin as above in CVA  - LDL results: pending    Pulm  - call provider if SPO2 < 94%    GI  #Nutrition/Fluids/Electrolytes   - replete K<4 and Mg <2  - Diet: reg  - IVF: none indicated at this time    Renal  - trend BMP    Infectious Disease  - Stroke Code CXR results: f/u    Endocrine  - A1C results: pending  - TSH results: pending    DVT Prophylaxis  - eliquis for DVT prophylaxis   - SCDs for DVT prophylaxis       Dispo: admitted to stroke  Discussed daily hospital plans and goals with patient and friend/caretaker at bedside    Discussed with Neurology Attending Dr. Lowe

## 2022-09-05 NOTE — ED PROVIDER NOTE - OBJECTIVE STATEMENT
87M former smoker (20py), daily etoh use (2-3 glasses of wine per night -- no WD/DT), afib on eliquis (last dose this AM), htn, c/o acute R facial droop/slurred speech at 6p today lasting 4-5min, now completely resolved w/o recurrence. witnessed by partner. at baseline just prior. no fever/chills, no ha/dizziness, no neck pain/stiffness, no photophobia/vision changes, no uri/cough, no cp/sob, no abd pain/n/v, no rash, no trauma, no ivdu, no phx acs/mi vs tia/cva.

## 2022-09-05 NOTE — ED ADULT NURSE REASSESSMENT NOTE - NIH STROKE SCALE: 9. BEST LANGUAGE, QM
Chief Complaint   Patient presents with   • Nasal Congestion     X1.5 weeks   • Cough     With green sputum.     BP (!) 165/98   Pulse 88   Temp 36.4 °C (97.6 °F) (Temporal)   Resp 18   Ht 1.829 m (6')   Wt 73.9 kg (162 lb 14.7 oz)   SpO2 98%   BMI 22.10 kg/m²     Pt ambulated into triage, mask in place. NAD, encouraged to return to the triage nurse or tech with any new complaints or symptoms.  
(0) No aphasia; normal
(0) No aphasia; normal

## 2022-09-05 NOTE — ED ADULT NURSE REASSESSMENT NOTE - NS ED NURSE REASSESS COMMENT FT1
Patient placed on continuous cardiac and pulse oximetry. Patient transported to CT via stretcher with RN while on continuous monitoring. All safety maintained. Will continue to monitor.
Pt tolerated PO meds well. No gargling, no drooling, no facial droop, no coughing.
Handoff received from offgoing RN. Pt A&Ox4, alert and awake. Pt and family member updated on plan of care. Continuous cardiac/pulse oximetry monitoring intact and assessment ongoing.

## 2022-09-05 NOTE — ED ADULT NURSE NOTE - OBJECTIVE STATEMENT
Patient presents AOX4 with friend reports at 6 PM today experienced 5 minutes of slurred speech. On arrival patient reports improvement in symptoms. Patient speaking in full, complete sentences. Answering questions and following verbal commands appropriately. Reports bilat sensation, +strong bilat hand grasp.

## 2022-09-06 NOTE — CONSULT NOTE ADULT - ASSESSMENT
per Neurology    87 y o  Male with PMHx of HTN (lisinopril 20mg), afib (eliquis 2.5mg BID), prostate CA s/p radiation, anemia (ferrous sulfate 325mg), Hepatitis B (treated), left hip arthritis, chronic diastolic HF with severe AS s/p TAVR in 2018 with LBBB (metoprolol succinate ER 25mg daily) coming in with 5 minutes of dysphagia, dysarthria, and questionable R facial droop witnessed by friend/caretaker which started at 6pm. CTH shows no acute intracranial hemorrhage or transcortical infarction. CTP negative for perfusion deficit. CTA H/N shows no hemodynamically significant large vessel extracranial stenosis or occlusion. NIHSS 2. Admitted for further stroke w/u    Neuro  #CVA workup  - continue eliquis 2.5mg BID  - continue atorvastatin 80mg daily  - q4hr stroke neuro checks and vitals  - obtain MRI Brain without contrast  - Stroke Code HCT Results: neg  - Stroke Code CTA Results: neg  - Stroke education    Cards  #HTN  - permissive hypertension, Goal -180  - hold home blood pressure medication for now  - obtain TTE with bubble  - Stroke Code EKG Results: f/u    #HLD  - high dose statin as above in CVA  - LDL results: pending    Pulm  - call provider if SPO2 < 94%    GI  #Nutrition/Fluids/Electrolytes   - replete K<4 and Mg <2  - Diet: reg  - IVF: none indicated at this time    Renal  - trend BMP    Infectious Disease  - Stroke Code CXR results: f/u    Endocrine  - A1C results: pending  - TSH results: pending    DVT Prophylaxis  - eliquis for DVT prophylaxis   - SCDs for DVT prophylaxis     
86 y/o M w/

## 2022-09-06 NOTE — PROGRESS NOTE ADULT - ASSESSMENT
87y Male with PMHx of HTN (lisinopril 20mg), afib (eliquis 2.5mg BID), prostate CA s/p radiation, anemia (ferrous sulfate 325mg), Hepatitis B (treated), left hip arthritis, chronic diastolic HF with severe AS s/p TAVR in 2018 with LBBB (metoprolol succinate ER 25mg daily) coming in with 5 minutes of dysphagia, dysarthria, and questionable R facial droop witnessed by friend/caretaker which started at 6pm. CTH shows no acute intracranial hemorrhage or transcortical infarction. CTP negative for perfusion deficit. CTA H/N shows no hemodynamically significant large vessel extracranial stenosis or occlusion. NIHSS 2. Admitted for further stroke w/u    Neuro  #CVA workup  - continue eliquis 2.5mg BID  - continue atorvastatin 80mg daily  - q4hr stroke neuro checks and vitals  - obtain MRI Brain without contrast - if presence of amyloid (signified by old microhemorrhages), would consider Watchman as AC unable to be restarted due to bleed risk  - Stroke Code HCT Results: neg  - Stroke Code CTA Results: neg  - Stroke education    Cards  #HTN  - permissive hypertension, Goal -180  - hold home blood pressure medication for now  - obtain TTE with bubble/DAYNE- although with known afib, if DAYNE positive for smoke in [place], then would increased eliquis to 5mg BID for further stroke prevention as cerebral event (TIA vs stroke) with compliant eliquis use at lower dose  - Stroke Code EKG Results: f/u    #HLD  - high dose statin as above in CVA  - LDL results: 117    Pulm  - call provider if SPO2 < 94%    GI  #Nutrition/Fluids/Electrolytes   - replete K<4 and Mg <2  - Diet: DASH  - IVF: none indicated at this time    Endocrine  - A1C results: 5.8  - TSH results: 3.75    DVT Prophylaxis  - eliquis for DVT prophylaxis   - SCDs for DVT prophylaxis       IDR Goals: Goals reviewed at interdisciplinary rounds with case management, social work, physical therapy, occupational therapy, and speech language pathology.   Please see specific therapy  notes for in depth goals.  Dispo: home, no needs     Discussed daily hospital plans and goals with patient and family at bedside.    Discussed with Neurology Attending Dr. Viveros 87y Male with PMHx of HTN (lisinopril 20mg), afib (eliquis 2.5mg BID), prostate CA s/p radiation, anemia (ferrous sulfate 325mg), Hepatitis B (treated), left hip arthritis, chronic diastolic HF with severe AS s/p TAVR in 2018 with LBBB (metoprolol succinate ER 25mg daily) coming in with 5 minutes of dysphagia, dysarthria, and questionable R facial droop witnessed by friend/caretaker which started at 6pm. CTH shows no acute intracranial hemorrhage or transcortical infarction. CTP negative for perfusion deficit. CTA H/N shows no hemodynamically significant large vessel extracranial stenosis or occlusion. NIHSS 2. Admitted for further stroke w/u    Neuro  #CVA workup  - continue eliquis 2.5mg BID  - continue atorvastatin 80mg daily  - q4hr stroke neuro checks and vitals  - obtain MRI Brain without contrast - if presence of amyloid (signified by old microhemorrhages), would consider Watchman as AC unable to be restarted due to bleed risk  - Stroke Code HCT Results: neg  - Stroke Code CTA Results: neg  - FHx: father  of hemorrhage, daughter with provoked DVT after international flight  - Stroke education    Cards  #HTN  - permissive hypertension, Goal -180  - hold home blood pressure medication for now  - obtain TTE with bubble/DAYNE- although with known afib, if DAYNE positive for smoke in [place], then would increased eliquis to 5mg BID for further stroke prevention as cerebral event (TIA vs stroke) with compliant eliquis use at lower dose  - Stroke Code EKG Results: f/u    #HLD  - high dose statin as above in CVA  - LDL results: 117    Pulm  - call provider if SPO2 < 94%    GI  #Nutrition/Fluids/Electrolytes   - replete K<4 and Mg <2  - Diet: DASH  - IVF: none indicated at this time    Endocrine  - A1C results: 5.8  - TSH results: 3.75    DVT Prophylaxis  - eliquis for DVT prophylaxis   - SCDs for DVT prophylaxis       IDR Goals: Goals reviewed at interdisciplinary rounds with case management, social work, physical therapy, occupational therapy, and speech language pathology.   Please see specific therapy  notes for in depth goals.  Dispo: home, no needs     Discussed daily hospital plans and goals with patient and family at bedside.    Discussed with Neurology Attending Dr. Viveros

## 2022-09-06 NOTE — OCCUPATIONAL THERAPY INITIAL EVALUATION ADULT - PERTINENT HX OF CURRENT PROBLEM, REHAB EVAL
87y Male with PMHx of HTN, afib, prostate CA s/p radiation, anemia, Hepatitis B (treated), left hip arthritis, chronic diastolic HF with severe AS s/p TAVR in 2018 with LBBB coming in with 5 minutes of dysphagia, dysarthria, and questionable R facial droop witnessed by friend/caretaker which started at 6pm. States when the patient tried to drink water, he could not swallow and the water fell out of his mouth. Passed dysphagia screening in ED. CTH shows no acute intracranial hemorrhage or transcortical infarction. CTP negative for perfusion deficit. CTA H/N shows no hemodynamically significant large vessel extracranial stenosis or occlusion. Caretaker/friend states patient is religiously complaint with his meds.

## 2022-09-06 NOTE — PHYSICAL THERAPY INITIAL EVALUATION ADULT - ADDITIONAL COMMENTS
pt states that he lives w/ friend in an elevator access apt building w/ no stairs to enter. Denies use of DME for ambulation but does own a SC if needed. Denies hx of recent falls. states that he was independent in all ADLs prior to this admission

## 2022-09-06 NOTE — CONSULT NOTE ADULT - PROBLEM SELECTOR RECOMMENDATION 9
a/w dysphagia and possible R facial droop; sx resolved, concerning for CVA/TIA; cont. work-up and mgmt per Neuro; on DOAC + statin; plan for MRI brain, TTE w/ bubble

## 2022-09-06 NOTE — PHYSICAL THERAPY INITIAL EVALUATION ADULT - MODALITIES TREATMENT COMMENTS
smile, cheek puff, eyebrow raise: symmetrical. visual tracking (H test): smooth pursuit. visual field test (quadrant test): WNL B/L. no DDK noted

## 2022-09-06 NOTE — CONSULT NOTE ADULT - SUBJECTIVE AND OBJECTIVE BOX
Patient is a 87y old  Male who presents with a chief complaint of       HPI:   **STROKE HPI***    HPI: 87y Male with PMHx of HTN (lisinopril 20mg), afib (eliquis 2.5mg BID), prostate CA s/p radiation, anemia (ferrous sulfate 325mg), Hepatitis B (treated), left hip arthritis, chronic diastolic HF with severe AS s/p TAVR in 2018 with LBBB (metoprolol succinate ER 25mg daily) coming in with 5 minutes of dysphagia, dysarthria, and questionable R facial droop witnessed by friend/caretaker which started at 6pm. States when the patient tried to drink water, he could not swallow and the water fell out of his mouth. Passed dysphagia screening in ED. CTH shows no acute intracranial hemorrhage or transcortical infarction. CTP negative for perfusion deficit. CTA H/N shows no hemodynamically significant large vessel extracranial stenosis or occlusion.   Caretaker/friend states patient is religiously complaint with his meds.       PAST MEDICAL & SURGICAL HISTORY:  Hypertension      Aortic stenosis      OA (osteoarthritis)      Hepatitis  B      Lumbar herniated disc      Prostate cancer  radiation treatment      GERD (gastroesophageal reflux disease)      S/P appendectomy      S/P tonsillectomy      Surgery, elective  Shoulder surgery          FAMILY HISTORY:    T(C): 36.7 (09-05-22 @ 18:58), Max: 36.7 (09-05-22 @ 18:58)  HR: 82 (09-05-22 @ 19:25) (79 - 95)  BP: 175/100 (09-05-22 @ 19:25) (154/100 - 192/89)  RR: 18 (09-05-22 @ 19:25) (17 - 18)  SpO2: 94% (09-05-22 @ 19:20) (94% - 98%)    MEDICATION RECONCILIATION   MEDICATIONS  (STANDING):    MEDICATIONS  (PRN):    Allergies    No Known Allergies    Intolerances      Vital Signs Last 24 Hrs  T(C): 36.7 (05 Sep 2022 18:58), Max: 36.7 (05 Sep 2022 18:58)  T(F): 98.1 (05 Sep 2022 18:58), Max: 98.1 (05 Sep 2022 18:58)  HR: 82 (05 Sep 2022 19:25) (79 - 95)  BP: 175/100 (05 Sep 2022 19:25) (154/100 - 192/89)  BP(mean): --  RR: 18 (05 Sep 2022 19:25) (17 - 18)  SpO2: 94% (05 Sep 2022 19:20) (94% - 98%)    Parameters below as of 05 Sep 2022 19:20  Patient On (Oxygen Delivery Method): room air     (05 Sep 2022 19:37)      PAST MEDICAL & SURGICAL HISTORY:  Hypertension      Aortic stenosis      OA (osteoarthritis)      Hepatitis  B      Lumbar herniated disc      Prostate cancer  radiation treatment      GERD (gastroesophageal reflux disease)      S/P appendectomy      S/P tonsillectomy      Surgery, elective  Shoulder surgery          MEDICATIONS  (STANDING):  apixaban 2.5 milliGRAM(s) Oral every 12 hours  atorvastatin 80 milliGRAM(s) Oral at bedtime  sodium chloride 0.9%. 1000 milliLiter(s) (50 mL/Hr) IV Continuous <Continuous>    MEDICATIONS  (PRN):             FAMILY HISTORY:    CBC Full  -  ( 06 Sep 2022 05:30 )  WBC Count : 7.65 K/uL  RBC Count : 3.97 M/uL  Hemoglobin : 11.5 g/dL  Hematocrit : 35.3 %  Platelet Count - Automated : 221 K/uL  Mean Cell Volume : 88.9 fl  Mean Cell Hemoglobin : 29.0 pg  Mean Cell Hemoglobin Concentration : 32.6 gm/dL  Auto Neutrophil # : x  Auto Lymphocyte # : x  Auto Monocyte # : x  Auto Eosinophil # : x  Auto Basophil # : x  Auto Neutrophil % : x  Auto Lymphocyte % : x  Auto Monocyte % : x  Auto Eosinophil % : x  Auto Basophil % : x      09-06    134<L>  |  98  |  26<H>  ----------------------------<  91  4.4   |  26  |  1.33<H>    Ca    8.8      06 Sep 2022 05:30  Phos  4.0     09-06  Mg     2.2     09-06    TPro  7.3  /  Alb  4.4  /  TBili  0.4  /  DBili  x   /  AST  30  /  ALT  23  /  AlkPhos  67  09-05            Radiology :    < from: CT Brain Stroke Protocol (09.05.22 @ 19:07) >  ACC: [id number] EXAM:  CT BRAIN STROKE PROTOCOL                          PROCEDURE DATE:  09/05/2022          INTERPRETATION:  INDICATIONS: Stroke code. Slurred speech and question of   right facial droop.    TECHNIQUE: Serial axial images were obtained from the skull base to the   vertex without the use of intravenous contrast. Sagittal and coronal   reformatted images were created from the axial data set. Images were   reviewed in the bone, brain and subdural windows. RAPID artificial   intelligence was used for preliminary evaluation of intracranial   hemorrhage.    COMPARISON: Head CT dated 5/12/2022.    FINDINGS:  INTRA-AXIAL: No intracranial mass effect, acute hemorrhage, midline shift   or acute transcortical infarct is seen. Stable subcortical and   periventricular-white matter hypoattenuation likely reflecting sequela of   moderate chronic microvascular ischemia.  EXTRA-AXIAL: No extra-axial fluid collection is present.  VENTRICLES AND SULCI: Parenchymal volume is commensurate with patient age.  VISUALIZED SINUSES: No air-fluid levels are identified.  VISUALIZED MASTOIDS: Partial opacification of left mastoid air cells,   unchanged.  CALVARIUM: Normal.  MISCELLANEOUS: Absent bilateral native lenses. Chronic bilateral nasal   deformities, unchanged.    IMPRESSION: No acute intracranial hemorrhage or transcortical infarction.                    Vital Signs Last 24 Hrs  T(C): 36.5 (06 Sep 2022 09:46), Max: 36.7 (05 Sep 2022 18:58)  T(F): 97.7 (06 Sep 2022 09:46), Max: 98.1 (05 Sep 2022 18:58)  HR: 67 (06 Sep 2022 12:20) (64 - 98)  BP: 152/82 (06 Sep 2022 12:20) (142/103 - 192/89)  BP(mean): 112 (06 Sep 2022 12:20) (105 - 126)  RR: 18 (06 Sep 2022 12:20) (17 - 20)  SpO2: 97% (06 Sep 2022 12:20) (92% - 98%)    Parameters below as of 06 Sep 2022 12:20  Patient On (Oxygen Delivery Method): room air            REVIEW OF SYSTEMS:      CONSTITUTIONAL: No fever, weight loss, or fatigue  EYES: No eye pain, visual disturbances, or discharge  ENMT:  No difficulty hearing, tinnitus, vertigo; No sinus or throat pain  NECK: No pain or stiffness  BREASTS: No pain, masses, or nipple discharge  RESPIRATORY: No cough, wheezing, chills or hemoptysis; No shortness of breath  CARDIOVASCULAR: No chest pain, palpitations, dizziness, or leg swelling  GASTROINTESTINAL: No abdominal or epigastric pain. No nausea, vomiting, or hematemesis; No diarrhea or constipation. No melena or hematochezia.  GENITOURINARY: No dysuria, frequency, hematuria, or incontinence  NEUROLOGICAL:  per HPI   SKIN: No itching, burning, rashes, or lesions   LYMPH NODES: No enlarged glands  ENDOCRINE: No heat or cold intolerance; No hair loss  MUSCULOSKELETAL: No joint pain or swelling; No muscle, back, or extremity pain  PSYCHIATRIC: No depression, anxiety, mood swings, or difficulty sleeping  HEME/LYMPH: No easy bruising, or bleeding gums  ALLERGY AND IMMUNOLOGIC: No hives or eczema  VASCULAR: no swelling , erythema          Physical Exam:   87 y o man lying in semi Vigil's position , awake , alert , no complaints      Neurologic Exam:    Alert and oriented to person , place , date/year, speech fluent w/o dysarthria , follows commands , recent and remote memory intact , repetition intact , comprehension intact ,  attention/concentration intact , fund of knowledge appropriate    Cranial Nerves:     II :                         pupils equal , round and reactive to light , visual fields intact   III/ IV/VI :              extraocular movements intact , neg nystagmus , neg ptosis  V :                        facial sensation intact , V1-3 normal  VII :                      face symmetric , no droop , normal eye closure and smile  VIII :                     hearing intact to finger rub bilaterally  IX and X :             no hoarseness , gag intact , palate/ uvula rise symmetrically  XI :                       SCM / trapezius strength intact bilateral  XII :                      no tongue deviation    Motor Exam:    Right UE:                   : 5/5  wrist extensors/ flexors: 5/5  biceps :   5/5                    triceps :  5/5  deltoid :  5/5  negative pronator drift                               Left UE:                     : 5/5  wrist extensors/ flexors : 5/5  biceps :   5/5                    triceps :  5/5  deltoid :  5/5  negative pronator drift        Right LE:                 dorsiflexors :  5/5  plantar flexors :  5/5  quadriceps :  5/5  hamstrings :  5/5  hip flexors :  5/5    Left LE:                     dorsiflexors :  5/5  plantar flexors :  5/5  quadriceps :  5/5  hamstrings :  5/5  hip flexors :  5/5        Sensation:         intact to light touch x 4 extremities                         no neglect or extinction on double simultaneous testing                       DTR :                     biceps/brachioradialis : equal                                              patella/ankle : equal                                                                               neg Babinski         Coordination :      Finger to Nose :   slight r dysmetria      Gait :  not tested              PM&R Impression :     1) admitted for stroke workup / CT brain imaging negative for acute pathology , MRI brain pending    2) no focal weakness      Recommendations / Plan :     1) Physical / Occupational therapy focusing on therapeutic exercises , equipment evaluation , bed mobility/transfer out of bed evaluation , progressive ambulation with assistive devices prn .    2) Anticipated Disposition Plan / Recs  :   d/c home with no post discharge rehab needs 
  Patient is a 87y old  Male who presents with a chief complaint of     HPI:   **STROKE HPI***    HPI: 87y Male with PMHx of HTN (lisinopril 20mg), afib (eliquis 2.5mg BID), prostate CA s/p radiation, anemia (ferrous sulfate 325mg), Hepatitis B (treated), left hip arthritis, chronic diastolic HF with severe AS s/p TAVR in 2018 with LBBB (metoprolol succinate ER 25mg daily) coming in with 5 minutes of dysphagia, dysarthria, and questionable R facial droop witnessed by friend/caretaker which started at 6pm. States when the patient tried to drink water, he could not swallow and the water fell out of his mouth. Passed dysphagia screening in ED. CTH shows no acute intracranial hemorrhage or transcortical infarction. CTP negative for perfusion deficit. CTA H/N shows no hemodynamically significant large vessel extracranial stenosis or occlusion.   Caretaker/friend states patient is religiously complaint with his meds.       Review of Systems: 12 point review of systems otherwise negative    PAST MEDICAL & SURGICAL HISTORY:  Hypertension      Aortic stenosis      OA (osteoarthritis)      Hepatitis  B      Lumbar herniated disc      Prostate cancer  radiation treatment      GERD (gastroesophageal reflux disease)      S/P appendectomy      S/P tonsillectomy      Surgery, elective  Shoulder surgery    Social History:  Patient lives with friend/caretaker  Smoking status: denies  Drinking: occasional glasses of wine  Drug Use: denies (05 Sep 2022 19:37)    FAMILY HISTORY:  daughter reportedly had a DVT    MEDICATIONS  (STANDING):  apixaban 2.5 milliGRAM(s) Oral every 12 hours  atorvastatin 80 milliGRAM(s) Oral at bedtime  sodium chloride 0.9%. 1000 milliLiter(s) (50 mL/Hr) IV Continuous <Continuous>    MEDICATIONS  (PRN):      Allergies    No Known Allergies    Intolerances        Vital Signs Last 24 Hrs  T(C): 36.5 (06 Sep 2022 09:46), Max: 36.7 (05 Sep 2022 18:58)  T(F): 97.7 (06 Sep 2022 09:46), Max: 98.1 (05 Sep 2022 18:58)  HR: 67 (06 Sep 2022 12:20) (64 - 98)  BP: 152/82 (06 Sep 2022 12:20) (142/103 - 192/89)  BP(mean): 112 (06 Sep 2022 12:20) (105 - 126)  RR: 18 (06 Sep 2022 12:20) (17 - 20)  SpO2: 97% (06 Sep 2022 12:20) (92% - 98%)    Parameters below as of 06 Sep 2022 12:20  Patient On (Oxygen Delivery Method): room air      CAPILLARY BLOOD GLUCOSE  115 (05 Sep 2022 19:13)      POCT Blood Glucose.: 115 mg/dL (05 Sep 2022 18:57)      09-06 @ 07:01  -  09-06 @ 13:54  --------------------------------------------------------  IN: 200 mL / OUT: 0 mL / NET: 200 mL        Physical Exam:  (earlier today)  Daily Height in cm: 165.1 (05 Sep 2022 18:58)    Daily   General:  well-appearing in NAD  HEENT:  MMM  CV:  irregular S1S2  Lungs:  CTA B/L  Abdomen:  soft NT ND  Extremities:  no edema B/L LE  Skin:  WWP  Neuro:  AAOx3, no dysarthria    LABS:                        11.5   7.65  )-----------( 221      ( 06 Sep 2022 05:30 )             35.3     09-06    134<L>  |  98  |  26<H>  ----------------------------<  91  4.4   |  26  |  1.33<H>    Ca    8.8      06 Sep 2022 05:30  Phos  4.0     09-06  Mg     2.2     09-06    TPro  7.3  /  Alb  4.4  /  TBili  0.4  /  DBili  x   /  AST  30  /  ALT  23  /  AlkPhos  67  09-05    PT/INR - ( 05 Sep 2022 19:05 )   PT: 12.7 sec;   INR: 1.07          PTT - ( 05 Sep 2022 19:05 )  PTT:28.9 sec

## 2022-09-06 NOTE — PHYSICAL THERAPY INITIAL EVALUATION ADULT - PERTINENT HX OF CURRENT PROBLEM, REHAB EVAL
87y Male with PMHx of HTN (lisinopril 20mg), afib (eliquis 2.5mg BID), prostate CA s/p radiation, anemia (ferrous sulfate 325mg), Hepatitis B (treated), left hip arthritis, chronic diastolic HF with severe AS s/p TAVR in 2018 with LBBB (metoprolol succinate ER 25mg daily) coming in with 5 minutes of dysphagia, dysarthria, and questionable R facial droop witnessed by friend/caretaker which started at 6pm. CTH shows no acute intracranial hemorrhage or transcortical infarction. CTP negative for perfusion deficit. CTA H/N shows no hemodynamically significant large vessel extracranial stenosis or occlusion. NIHSS 2. Admitted for further stroke w/u

## 2022-09-06 NOTE — OCCUPATIONAL THERAPY INITIAL EVALUATION ADULT - MANUAL MUSCLE TESTING RESULTS, REHAB EVAL
BUE 5/5 throughout, except R shoulder flexion 4/5 due to arthritis (Pt currently attending PT). CN Testing:  II: Visual fields are full to confrontation.  III, IV, VI: Extraocular movements are intact without nystagmus. Pupils equally round and reactive to light  V:  Facial sensation V1-V3 equal and intact   VII: Face is symmetric with normal eye closure and smile  XI: Head turning and shoulder shrug are intact.  XII: Tongue protrudes midline

## 2022-09-06 NOTE — CONSULT NOTE ADULT - PROBLEM SELECTOR PROBLEM 6
Severe aortic stenosis

## 2022-09-06 NOTE — PROGRESS NOTE ADULT - SUBJECTIVE AND OBJECTIVE BOX
Neurology Stroke Progress Note    INTERVAL HPI/OVERNIGHT EVENTS:  Patient seen and examined. No overnight events. Patient back at baseline with no recurrence of symptoms. Roommate at bedside who initially noted facial signs states that patient looks like he is back to normal.     MEDICATIONS  (STANDING):  apixaban 2.5 milliGRAM(s) Oral every 12 hours  atorvastatin 80 milliGRAM(s) Oral at bedtime  sodium chloride 0.9%. 1000 milliLiter(s) (50 mL/Hr) IV Continuous <Continuous>    MEDICATIONS  (PRN):      Allergies    No Known Allergies    Intolerances        Vital Signs Last 24 Hrs  T(C): 36.9 (06 Sep 2022 14:21), Max: 36.9 (06 Sep 2022 14:21)  T(F): 98.4 (06 Sep 2022 14:21), Max: 98.4 (06 Sep 2022 14:21)  HR: 67 (06 Sep 2022 12:20) (64 - 98)  BP: 152/82 (06 Sep 2022 12:20) (142/103 - 192/89)  BP(mean): 112 (06 Sep 2022 12:20) (105 - 126)  RR: 18 (06 Sep 2022 12:20) (17 - 20)  SpO2: 97% (06 Sep 2022 12:20) (92% - 98%)    Parameters below as of 06 Sep 2022 12:20  Patient On (Oxygen Delivery Method): room air        Physical exam:  General: No acute distress, awake and alert  Eyes: Anicteric sclerae, moist conjunctivae, see below for CNs  Neck: trachea midline, FROM, supple, no thyromegaly or lymphadenopathy  Pulmonary: No use of accessory muscles  GI: Abdomen soft, non-distended, non-tender  Extremities:  no edema    Neurologic:  -Mental status: Awake, alert, oriented to person, place, and time. Speech is fluent with intact naming, repetition, and comprehension, no dysarthria. Recent and remote memory intact. Follows commands. Attention/concentration intact. Fund of knowledge appropriate.  -Cranial nerves:   II: Visual fields are full to confrontation.  III, IV, VI: Extraocular movements are intact without nystagmus. Pupils equally round and reactive to light  V:  Facial sensation V1-V3 equal and intact   VII: Face is symmetric with normal eye closure and smile  VIII: Hearing is bilaterally intact   XII: Tongue protrudes midline  Motor: Normal bulk and tone. No pronator drift. Strength bilateral upper extremity 5/5, bilateral lower extremities 5/5.  Sensation: Intact to light touch bilaterally. No neglect or extinction on double simultaneous testing.  Coordination: No dysmetria on finger-to-nose bilaterally    LABS:                        11.5   7.65  )-----------( 221      ( 06 Sep 2022 05:30 )             35.3     09-06    134<L>  |  98  |  26<H>  ----------------------------<  91  4.4   |  26  |  1.33<H>    Ca    8.8      06 Sep 2022 05:30  Phos  4.0     09-06  Mg     2.2     09-06    TPro  7.3  /  Alb  4.4  /  TBili  0.4  /  DBili  x   /  AST  30  /  ALT  23  /  AlkPhos  67  09-05    PT/INR - ( 05 Sep 2022 19:05 )   PT: 12.7 sec;   INR: 1.07          PTT - ( 05 Sep 2022 19:05 )  PTT:28.9 sec      RADIOLOGY & ADDITIONAL TESTS:

## 2022-09-07 NOTE — DISCHARGE NOTE PROVIDER - NSDCFUSCHEDAPPT_GEN_ALL_CORE_FT
[facility]ician [facility]  [facility] [id number]  Scheduled Appointment: 10/06/2022

## 2022-09-07 NOTE — PROGRESS NOTE ADULT - ASSESSMENT
87y Male with PMHx of HTN (lisinopril 20mg), afib (eliquis 2.5mg BID), prostate CA s/p radiation, anemia (ferrous sulfate 325mg), Hepatitis B (treated), left hip arthritis, chronic diastolic HF with severe AS s/p TAVR in 2018 with LBBB (metoprolol succinate ER 25mg daily) coming in with 5 minutes of dysphagia, dysarthria, and questionable R facial droop witnessed by friend/caretaker which started at 6pm. CTH shows no acute intracranial hemorrhage or transcortical infarction. CTP negative for perfusion deficit. CTA H/N shows no hemodynamically significant large vessel extracranial stenosis or occlusion. NIHSS 2.     Neuro  #CVA workup  - continue eliquis 2.5mg BID  - continue atorvastatin 80mg daily  - q4hr stroke neuro checks and vitals  - obtain MRI Brain without contrast - if presence of amyloid (signified by old microhemorrhages), would consider Watchman as AC unable to be restarted due to bleed risk  - Stroke Code HCT Results: neg  - Stroke Code CTA Results: neg  - FHx: father  of hemorrhage, daughter with provoked DVT after international flight  - Stroke education    Cards  #HTN  - permissive hypertension, Goal -180  - hold home blood pressure medication for now  - obtain TTE with bubble/DAYNE- although with known afib, if DAYNE positive for smoke in [place], then would increased eliquis to 5mg BID for further stroke prevention as cerebral event (TIA vs stroke) with compliant eliquis use at lower dose  - Stroke Code EKG Results: f/u    #HLD  - high dose statin as above in CVA  - LDL results: 117    Pulm  - call provider if SPO2 < 94%    GI  #Nutrition/Fluids/Electrolytes   - replete K<4 and Mg <2  - Diet: DASH  - IVF: none    Endocrine  - A1C results: 5.8  - TSH results: 3.75    DVT Prophylaxis  - eliquis for DVT prophylaxis   - SCDs for DVT prophylaxis       IDR Goals: Goals reviewed at interdisciplinary rounds with case management, social work, physical therapy, occupational therapy, and speech language pathology.   Please see specific therapy  notes for in depth goals.  Dispo: home, no needs     Discussed daily hospital plans and goals with patient and family at bedside.    Discussed with Neurology Attending Dr. Viveros 87y Male with PMHx of HTN (lisinopril 20mg), afib (eliquis 2.5mg BID), prostate CA s/p radiation, anemia (ferrous sulfate 325mg), Hepatitis B (treated), left hip arthritis, chronic diastolic HF with severe AS s/p TAVR in 2018 with LBBB (metoprolol succinate ER 25mg daily) coming in with 5 minutes of dysphagia, dysarthria, and questionable R facial droop witnessed by friend/caretaker which started at 6pm. CTH shows no acute intracranial hemorrhage or transcortical infarction. CTP negative for perfusion deficit. CTA H/N shows no hemodynamically significant large vessel extracranial stenosis or occlusion. NIHSS 2.     Neuro  #CVA workup  - with patient's worsening dyarthria, will increase eliquis 5mg BID for now until MRI   - continue atorvastatin 80mg daily  - q4hr stroke neuro checks and vitals  - obtain MRI Brain without contrast - if presence of amyloid (signified by old microhemorrhages), would consider Watchman as AC unable to be restarted due to bleed risk  - Stroke Code HCT Results: neg  - Stroke Code CTA Results: neg  - FHx: father  of hemorrhage, daughter with provoked DVT after international flight  - Stroke education    Cards  #HTN  - permissive hypertension, Goal -180  - hold home blood pressure medication for now  - obtain TTE with bubble/DAYNE- although with known afib, if DAYNE positive for smoke in [place], then would increased eliquis to 5mg BID for further stroke prevention as cerebral event (TIA vs stroke) with compliant eliquis use at lower dose  - Stroke Code EKG Results: f/u    #HLD  - high dose statin as above in CVA  - LDL results: 117    Pulm  - call provider if SPO2 < 94%    GI  #Nutrition/Fluids/Electrolytes   - replete K<4 and Mg <2  - Diet: DASH  - IVF: none    Endocrine  - A1C results: 5.8  - TSH results: 3.75    DVT Prophylaxis  - eliquis for DVT prophylaxis   - SCDs for DVT prophylaxis       IDR Goals: Goals reviewed at interdisciplinary rounds with case management, social work, physical therapy, occupational therapy, and speech language pathology.   Please see specific therapy  notes for in depth goals.  Dispo: home, no needs     Discussed daily hospital plans and goals with patient and family at bedside.    Discussed with Neurology Attending Dr. Viveros

## 2022-09-07 NOTE — DISCHARGE NOTE PROVIDER - NSDCCPCAREPLAN_GEN_ALL_CORE_FT
PRINCIPAL DISCHARGE DIAGNOSIS  Diagnosis: Ischemic stroke  Assessment and Plan of Treatment: An ischemic stroke occurs when blood flow to part of your brain is blocked. The block is usually caused by a blood clot that gets stuck in a narrow blood vessel. When oxygen cannot get to an area of the brain, tissue in that area may get damaged. The damage can cause loss of body functions controlled by that area of the brain. A stroke is a medical emergency that needs immediate treatment. Most medicines and treatments work best the sooner they are given.  Ischemic Stroke  DISCHARGE INSTRUCTIONS:  Call your local emergency number (801 in the ) or have someone else call if:  You have any of the following signs of a stroke:  Numbness or drooping on one side of your face  Weakness in an arm or leg  Confusion or difficulty speaking  Dizziness, a severe headache, or vision loss  BE FAST SIGNS OF A STROKE  You have a seizure.  You have chest pain or shortness of breath.  Seek care immediately if:  Your arm or leg feels warm, tender, and painful. It may look swollen and red.  You have loss of balance or coordination.  You have double vision or vision loss.  You have unusual or heavy bleeding.  Call your doctor if:  Your blood pressure is higher or lower than you were told it should be.  You have questions or concerns about your condition or care.  Warning signs of a stroke: The words BE FAST can help you remember and recognize warning signs of a stroke:  B = Balance: Sudden loss of balance  E = Eyes: Loss of vision in one or both eyes  F = Face: Face droops on one side  A = Arms: Arm drops when both arms are raised  S = Speech: Speech is slurred or sounds different  T = Time: Time to get help immediately

## 2022-09-07 NOTE — DISCHARGE NOTE PROVIDER - NSDCMRMEDTOKEN_GEN_ALL_CORE_FT
Eliquis 2.5 mg oral tablet: 1 tab(s) orally 2 times a day  gabapentin 100 mg oral capsule: 2 cap(s) orally once a day  lisinopril 20 mg oral tablet: 1 tab(s) orally once a day  metoprolol succinate 25 mg oral tablet, extended release: 1 tab(s) orally once a day  omeprazole 20 mg oral delayed release tablet: 1 tab(s) orally once a day   apixaban 5 mg oral tablet: 1 tab(s) orally every 12 hours  Lipitor 80 mg oral tablet: 1 tab(s) orally once a day (at bedtime)  lisinopril 20 mg oral tablet: 1 tab(s) orally once a day  metoprolol succinate 25 mg oral tablet, extended release: 1 tab(s) orally once a day

## 2022-09-07 NOTE — PROGRESS NOTE ADULT - SUBJECTIVE AND OBJECTIVE BOX
Neurology Stroke Progress Note    INTERVAL HPI/OVERNIGHT EVENTS:  Patient seen and examined. Overnight, BP elevated to 188. Labetalol 5mg IVP given with response.     MEDICATIONS  (STANDING):  apixaban 2.5 milliGRAM(s) Oral every 12 hours  atorvastatin 80 milliGRAM(s) Oral at bedtime  gabapentin 200 milliGRAM(s) Oral daily  metoprolol succinate ER 25 milliGRAM(s) Oral daily  pantoprazole    Tablet 40 milliGRAM(s) Oral before breakfast    MEDICATIONS  (PRN):      Allergies    No Known Allergies    Intolerances        Vital Signs Last 24 Hrs  T(C): 36.4 (07 Sep 2022 05:49), Max: 36.9 (06 Sep 2022 14:21)  T(F): 97.6 (07 Sep 2022 05:49), Max: 98.4 (06 Sep 2022 14:21)  HR: 78 (07 Sep 2022 05:25) (64 - 83)  BP: 118/61 (07 Sep 2022 05:25) (118/61 - 189/91)  BP(mean): 82 (07 Sep 2022 05:25) (82 - 127)  RR: 18 (07 Sep 2022 05:25) (16 - 18)  SpO2: 97% (07 Sep 2022 05:25) (95% - 97%)    Parameters below as of 07 Sep 2022 05:25  Patient On (Oxygen Delivery Method): room air        Physical exam:  General: No acute distress, awake and alert  Eyes: Anicteric sclerae, moist conjunctivae, see below for CNs  Neck: trachea midline, FROM, supple, no thyromegaly or lymphadenopathy  Cardiovascular: Regular rate and rhythm, no murmurs, rubs, or gallops. No carotid bruits.   Pulmonary: Anterior breath sounds clear bilaterally, no crackles or wheezing. No use of accessory muscles  GI: Abdomen soft, non-distended, non-tender  Extremities: Radial and DP pulses +2, no edema    Neurologic:  -Mental status: Awake, alert, oriented to person, place, and time. Speech is fluent with intact naming, repetition, and comprehension, no dysarthria. Recent and remote memory intact. Follows commands. Attention/concentration intact. Fund of knowledge appropriate.  -Cranial nerves:   II: Visual fields are full to confrontation.  III, IV, VI: Extraocular movements are intact without nystagmus. Pupils equally round and reactive to light  V:  Facial sensation V1-V3 equal and intact   VII: Face is symmetric with normal eye closure and smile  VIII: Hearing is bilaterally intact to finger rub  IX, X: Uvula is midline and soft palate rises symmetrically  XI: Head turning and shoulder shrug are intact.  XII: Tongue protrudes midline  Motor: Normal bulk and tone. No pronator drift. Strength bilateral upper extremity 5/5, bilateral lower extremities 5/5.  Rapid alternating movements intact and symmetric  Sensation: Intact to light touch bilaterally. No neglect or extinction on double simultaneous testing.  Coordination: No dysmetria on finger-to-nose and heel-to-shin bilaterally  Reflexes: Downgoing toes bilaterally   Gait: Narrow gait and steady    LABS:                        12.9   10.38 )-----------( 223      ( 07 Sep 2022 05:57 )             38.3     09-07    135  |  100  |  21  ----------------------------<  99  4.2   |  22  |  1.16    Ca    8.8      07 Sep 2022 05:57  Phos  3.7     09-07  Mg     2.0     09-07    TPro  7.3  /  Alb  4.4  /  TBili  0.4  /  DBili  x   /  AST  30  /  ALT  23  /  AlkPhos  67  09-05    PT/INR - ( 05 Sep 2022 19:05 )   PT: 12.7 sec;   INR: 1.07          PTT - ( 05 Sep 2022 19:05 )  PTT:28.9 sec      RADIOLOGY & ADDITIONAL TESTS:     Neurology Stroke Progress Note    INTERVAL HPI/OVERNIGHT EVENTS:  Patient seen and examined. Overnight, BP elevated to 188. Labetalol 5mg IVP given with response.     MEDICATIONS  (STANDING):  apixaban 2.5 milliGRAM(s) Oral every 12 hours  atorvastatin 80 milliGRAM(s) Oral at bedtime  gabapentin 200 milliGRAM(s) Oral daily  metoprolol succinate ER 25 milliGRAM(s) Oral daily  pantoprazole    Tablet 40 milliGRAM(s) Oral before breakfast    MEDICATIONS  (PRN):      Allergies    No Known Allergies    Intolerances        Vital Signs Last 24 Hrs  T(C): 36.4 (07 Sep 2022 05:49), Max: 36.9 (06 Sep 2022 14:21)  T(F): 97.6 (07 Sep 2022 05:49), Max: 98.4 (06 Sep 2022 14:21)  HR: 78 (07 Sep 2022 05:25) (64 - 83)  BP: 118/61 (07 Sep 2022 05:25) (118/61 - 189/91)  BP(mean): 82 (07 Sep 2022 05:25) (82 - 127)  RR: 18 (07 Sep 2022 05:25) (16 - 18)  SpO2: 97% (07 Sep 2022 05:25) (95% - 97%)    Parameters below as of 07 Sep 2022 05:25  Patient On (Oxygen Delivery Method): room air        Physical exam:  General: No acute distress, awake and alert  Eyes: Anicteric sclerae, moist conjunctivae, see below for CNs  Neck: trachea midline, FROM, supple, no thyromegaly or lymphadenopathy  Cardiovascular: Regular rate and rhythm on tele  Pulmonary: . No use of accessory muscles  GI: Abdomen soft, non-distended, non-tender  Extremities: no edema    Neurologic:  -Mental status: Awake, alert, oriented to person, place, and time. Speech is fluent with intact naming, repetition, and comprehension, no dysarthria. Recent and remote memory intact. Follows commands. Attention/concentration intact. Fund of knowledge appropriate.  -Cranial nerves:   II: Visual fields are full to confrontation.  III, IV, VI: Extraocular movements are intact without nystagmus. Pupils equally round and reactive to light  V:  Facial sensation V1-V3 equal and intact   VII: Face is symmetric with normal eye closure and smile  VIII: Hearing is bilaterally intact  XII: Tongue protrudes midline  Motor: Normal bulk and tone. ?RUE parietal drift. Strength bilateral upper extremity 5/5, bilateral lower extremities 5/5.  Sensation: Intact to light touch bilaterally. No neglect or extinction on double simultaneous testing.  Coordination: No dysmetria on finger-to-nose bilaterally    LABS:                        12.9   10.38 )-----------( 223      ( 07 Sep 2022 05:57 )             38.3     09-07    135  |  100  |  21  ----------------------------<  99  4.2   |  22  |  1.16    Ca    8.8      07 Sep 2022 05:57  Phos  3.7     09-07  Mg     2.0     09-07    TPro  7.3  /  Alb  4.4  /  TBili  0.4  /  DBili  x   /  AST  30  /  ALT  23  /  AlkPhos  67  09-05    PT/INR - ( 05 Sep 2022 19:05 )   PT: 12.7 sec;   INR: 1.07          PTT - ( 05 Sep 2022 19:05 )  PTT:28.9 sec      RADIOLOGY & ADDITIONAL TESTS:     Neurology Stroke Progress Note    INTERVAL HPI/OVERNIGHT EVENTS:  Patient seen and examined. Overnight, BP elevated to 188. Labetalol 5mg IVP given with response.     MEDICATIONS  (STANDING):  apixaban 2.5 milliGRAM(s) Oral every 12 hours  atorvastatin 80 milliGRAM(s) Oral at bedtime  gabapentin 200 milliGRAM(s) Oral daily  metoprolol succinate ER 25 milliGRAM(s) Oral daily  pantoprazole    Tablet 40 milliGRAM(s) Oral before breakfast    MEDICATIONS  (PRN):      Allergies    No Known Allergies    Intolerances        Vital Signs Last 24 Hrs  T(C): 36.4 (07 Sep 2022 05:49), Max: 36.9 (06 Sep 2022 14:21)  T(F): 97.6 (07 Sep 2022 05:49), Max: 98.4 (06 Sep 2022 14:21)  HR: 78 (07 Sep 2022 05:25) (64 - 83)  BP: 118/61 (07 Sep 2022 05:25) (118/61 - 189/91)  BP(mean): 82 (07 Sep 2022 05:25) (82 - 127)  RR: 18 (07 Sep 2022 05:25) (16 - 18)  SpO2: 97% (07 Sep 2022 05:25) (95% - 97%)    Parameters below as of 07 Sep 2022 05:25  Patient On (Oxygen Delivery Method): room air        Physical exam:  General: No acute distress, awake and alert  Eyes: Anicteric sclerae, moist conjunctivae, see below for CNs  Neck: trachea midline, FROM, supple, no thyromegaly or lymphadenopathy  Cardiovascular: Regular rate and rhythm on tele  Pulmonary: . No use of accessory muscles  GI: Abdomen soft, non-distended, non-tender  Extremities: no edema    Neurologic:  -Mental status: Awake, alert, oriented to person, place, and time. Speech is fluent with intact naming, repetition, and comprehension, mild dysarthria. Recent and remote memory intact. Follows commands. Attention/concentration intact. Fund of knowledge appropriate.  -Cranial nerves:   II: Visual fields are full to confrontation.  III, IV, VI: Extraocular movements are intact without nystagmus. Pupils equally round and reactive to light  V:  Facial sensation V1-V3 equal and intact   VII: Face is symmetric with normal eye closure and smile  VIII: Hearing is bilaterally intact  XII: Tongue protrudes midline  Motor: Normal bulk and tone. ?LUE parietal drift. Strength bilateral upper extremity 5/5, bilateral lower extremities 5/5.  Sensation: Intact to light touch bilaterally. No neglect or extinction on double simultaneous testing.  Coordination: No dysmetria on finger-to-nose bilaterally    LABS:                        12.9   10.38 )-----------( 223      ( 07 Sep 2022 05:57 )             38.3     09-07    135  |  100  |  21  ----------------------------<  99  4.2   |  22  |  1.16    Ca    8.8      07 Sep 2022 05:57  Phos  3.7     09-07  Mg     2.0     09-07    TPro  7.3  /  Alb  4.4  /  TBili  0.4  /  DBili  x   /  AST  30  /  ALT  23  /  AlkPhos  67  09-05    PT/INR - ( 05 Sep 2022 19:05 )   PT: 12.7 sec;   INR: 1.07          PTT - ( 05 Sep 2022 19:05 )  PTT:28.9 sec      RADIOLOGY & ADDITIONAL TESTS:

## 2022-09-07 NOTE — PROGRESS NOTE ADULT - SUBJECTIVE AND OBJECTIVE BOX
Patient is a 87y old  Male who presents with a chief complaint of     INTERVAL HPI/OVERNIGHT EVENTS:    Pt. seen and examined earlier today  Pt. feels well, has no complaints    Review of Systems: 12 point review of systems otherwise negative    MEDICATIONS  (STANDING):  apixaban 5 milliGRAM(s) Oral every 12 hours  atorvastatin 80 milliGRAM(s) Oral at bedtime  gabapentin 200 milliGRAM(s) Oral daily  metoprolol succinate ER 25 milliGRAM(s) Oral daily  pantoprazole    Tablet 40 milliGRAM(s) Oral before breakfast    MEDICATIONS  (PRN):      Allergies    No Known Allergies    Intolerances          Vital Signs Last 24 Hrs  T(C): 36.9 (07 Sep 2022 13:41), Max: 36.9 (07 Sep 2022 13:41)  T(F): 98.4 (07 Sep 2022 13:41), Max: 98.4 (07 Sep 2022 13:41)  HR: 79 (07 Sep 2022 13:41) (64 - 83)  BP: 141/68 (07 Sep 2022 13:41) (118/61 - 189/91)  BP(mean): 98 (07 Sep 2022 13:41) (82 - 127)  RR: 18 (07 Sep 2022 13:41) (16 - 18)  SpO2: 93% (07 Sep 2022 13:41) (93% - 97%)    Parameters below as of 07 Sep 2022 13:41  Patient On (Oxygen Delivery Method): room air      CAPILLARY BLOOD GLUCOSE          09-06 @ 07:01 - 09-07 @ 07:00  --------------------------------------------------------  IN: 780 mL / OUT: 950 mL / NET: -170 mL    09-07 @ 07:01 - 09-07 @ 17:20  --------------------------------------------------------  IN: 0 mL / OUT: 750 mL / NET: -750 mL        Physical Exam:  (earlier today)  Daily     Daily   General:  well-appearing in NAD  HEENT:  MMM  CV:  irregular S1S2  Lungs:  CTA B/L  Abdomen:  soft NT ND  Extremities:  no edema B/L LE  Skin:  WWP  Neuro:  AAOx3    LABS:                        12.9   10.38 )-----------( 223      ( 07 Sep 2022 05:57 )             38.3     09-07    135  |  100  |  21  ----------------------------<  99  4.2   |  22  |  1.16    Ca    8.8      07 Sep 2022 05:57  Phos  3.7     09-07  Mg     2.0     09-07    TPro  7.3  /  Alb  4.4  /  TBili  0.4  /  DBili  x   /  AST  30  /  ALT  23  /  AlkPhos  67  09-05    PT/INR - ( 05 Sep 2022 19:05 )   PT: 12.7 sec;   INR: 1.07          PTT - ( 05 Sep 2022 19:05 )  PTT:28.9 sec

## 2022-09-07 NOTE — DISCHARGE NOTE PROVIDER - DISCHARGE DATE
Patient has the following symptoms: severe pain on the right side when sitting   The symptoms have been present for a couple of weeks  Patient was not offered an appointment.  Pharmacy has been verified.      08-Sep-2022

## 2022-09-07 NOTE — DISCHARGE NOTE PROVIDER - HOSPITAL COURSE
Hospital course:  87y Male with PMHx of HTN (lisinopril 20mg), afib (eliquis 2.5mg BID), prostate CA s/p radiation, anemia (ferrous sulfate 325mg), Hepatitis B (treated), left hip arthritis, chronic diastolic HF with severe AS s/p TAVR in 2018 with LBBB (metoprolol succinate ER 25mg daily) coming in with 5 minutes of dysphagia, dysarthria, and questionable R facial droop. Initial CT, CTA head and neck, and CT perfusion were unremarkable. Patient underwent MRI which demonstrated a small recent infarct involves the right centrum semiovale. DAYNE showed ***************. On 9/7, patient was noted to be increasingly dysarthric and had a left upper extremity parietal drift; Eliquis was increased to 5mg twice a day.     During this hospital course, patient had an ischemic stroke located in the right centrum semiovale as seen on MRI.   The stroke etiology is likely secondary to:  []atrial fibrillation  []small vessel disease from atherosclerotic risk factors  []other:  []etiology workup still in progress    Patient had the following workup done in house:  CT Head: No acute intracranial hemorrhage or transcortical infarction.  MR Head Non Contrast: Small recent infarct involves the right centrum semiovale, with background of moderate chronic small vessel ischemic change throughout cerebral white matter. Negative for brain hemorrhage.  CT Angio Head: No hemodynamically significant intracranial large vessel stenosis or occlusion.  CT Angio Neck: No hemodynamically significant large vessel extracranial stenosis or   occlusion.  Partially visualized small right and small/moderate left pleural effusions and mild bilateral interlobular septal thickening suggesting interstitial edema.  []echo  [x]labs: A1C: 5.8%, TSH: 3.750, LDL: 117  []other    Physical exam at discharge:    New medications on discharge: Eliquis 5mg BID  Labs to be followed up:  Imaging to be done as outpatient:  Further outpatient workup:   Hospital course:  87y Male with PMHx of HTN (lisinopril 20mg), afib (eliquis 2.5mg BID), prostate CA s/p radiation, anemia (ferrous sulfate 325mg), Hepatitis B (treated), left hip arthritis, chronic diastolic HF with severe AS s/p TAVR in 2018 with LBBB (metoprolol succinate ER 25mg daily) coming in with 5 minutes of dysphagia, dysarthria, and questionable R facial droop. Initial CT, CTA head and neck, and CT perfusion were unremarkable. Patient underwent MRI which demonstrated a small recent infarct involves the right centrum semiovale. DAYNE showed no PFO, EF 55-60%. On 9/7, patient was noted to be increasingly dysarthric and had a left upper extremity parietal drift; Eliquis was increased to 5mg twice a day.     During this hospital course, patient had an ischemic stroke located in the right centrum semiovale as seen on MRI.     Patient had the following workup done in house:  CT Head: No acute intracranial hemorrhage or transcortical infarction.  MR Head Non Contrast: Small recent infarct involves the right centrum semiovale, with background of moderate chronic small vessel ischemic change throughout cerebral white matter. Negative for brain hemorrhage.  CT Angio Head: No hemodynamically significant intracranial large vessel stenosis or occlusion.  CT Angio Neck: No hemodynamically significant large vessel extracranial stenosis or   occlusion.  Partially visualized small right and small/moderate left pleural effusions and mild bilateral interlobular septal thickening suggesting interstitial edema.  [x]echo:   Mild symmetric left ventricular hypertrophy.   2. Normal left ventricular systolic function.   3. Normal right ventricular size and systolic function.   4. Normal atria.   5. TAVR valve is seen in the aortic position with apparent normal   function, without evidence of prosthetic dysfunction.   6. Pulmonary artery systolic pressure is 33 mmHg.   7. No pericardial effusion.   8. The interatrial septum appears intact. Injection of agitated saline   via a peripheral vein reveals no evidence of a right-to-left shunt.      [x]labs: A1C: 5.8%, TSH: 3.750, LDL: 117      Physical exam at discharge:  Neurologic:  -Mental status: Awake, alert, oriented to person, place, and time. Speech is fluent with intact naming, repetition, and comprehension, mild dysarthria. Recent and remote memory intact. Follows commands. Attention/concentration intact. Fund of knowledge appropriate.  -Cranial nerves:   II: Visual fields are full to confrontation.  III, IV, VI: Extraocular movements are intact without nystagmus. Pupils equally round and reactive to light  V:  Facial sensation V1-V3 equal and intact   VII: Face is symmetric with normal eye closure and smile  VIII: Hearing is bilaterally intact  XII: Tongue protrudes midline  Motor: Normal bulk and tone. ?LUE parietal drift. Strength bilateral upper extremity 5/5, bilateral lower extremities 5/5.  Sensation: Intact to light touch bilaterally. No neglect or extinction on double simultaneous testing.    NIH 0      New medications on discharge: Eliquis 5mg BID  Labs to be followed up: none  Imaging to be done as outpatient: none  Further outpatient workup: none

## 2022-09-07 NOTE — DISCHARGE NOTE PROVIDER - CARE PROVIDER_API CALL
Richmond Viveros)  Neurology; Vascular Neurology  [address], [address] [place] [address]  Phone: [phone]  Fax: [phone]  Follow Up Time: 2 weeks

## 2022-09-08 NOTE — PROGRESS NOTE ADULT - SUBJECTIVE AND OBJECTIVE BOX
Physical Medicine and Rehabilitation Progress Note :     Patient is a 87y old  Male who presents with a chief complaint of     HPI:   **STROKE HPI***    HPI: 87y Male with PMHx of HTN (lisinopril 20mg), afib (eliquis 2.5mg BID), prostate CA s/p radiation, anemia (ferrous sulfate 325mg), Hepatitis B (treated), left hip arthritis, chronic diastolic HF with severe AS s/p TAVR in 2018 with LBBB (metoprolol succinate ER 25mg daily) coming in with 5 minutes of dysphagia, dysarthria, and questionable R facial droop witnessed by friend/caretaker which started at 6pm. States when the patient tried to drink water, he could not swallow and the water fell out of his mouth. Passed dysphagia screening in ED. CTH shows no acute intracranial hemorrhage or transcortical infarction. CTP negative for perfusion deficit. CTA H/N shows no hemodynamically significant large vessel extracranial stenosis or occlusion.   Caretaker/friend states patient is religiously complaint with his meds.       PAST MEDICAL & SURGICAL HISTORY:  Hypertension      Aortic stenosis      OA (osteoarthritis)      Hepatitis  B      Lumbar herniated disc      Prostate cancer  radiation treatment      GERD (gastroesophageal reflux disease)      S/P appendectomy      S/P tonsillectomy      Surgery, elective  Shoulder surgery          FAMILY HISTORY:    T(C): 36.7 (09-05-22 @ 18:58), Max: 36.7 (09-05-22 @ 18:58)  HR: 82 (09-05-22 @ 19:25) (79 - 95)  BP: 175/100 (09-05-22 @ 19:25) (154/100 - 192/89)  RR: 18 (09-05-22 @ 19:25) (17 - 18)  SpO2: 94% (09-05-22 @ 19:20) (94% - 98%)    MEDICATION RECONCILIATION   MEDICATIONS  (STANDING):    MEDICATIONS  (PRN):    Allergies    No Known Allergies    Intolerances      Vital Signs Last 24 Hrs  T(C): 36.7 (05 Sep 2022 18:58), Max: 36.7 (05 Sep 2022 18:58)  T(F): 98.1 (05 Sep 2022 18:58), Max: 98.1 (05 Sep 2022 18:58)  HR: 82 (05 Sep 2022 19:25) (79 - 95)  BP: 175/100 (05 Sep 2022 19:25) (154/100 - 192/89)  BP(mean): --  RR: 18 (05 Sep 2022 19:25) (17 - 18)  SpO2: 94% (05 Sep 2022 19:20) (94% - 98%)    Parameters below as of 05 Sep 2022 19:20  Patient On (Oxygen Delivery Method): room air     (05 Sep 2022 19:37)                            12.9   10.38 )-----------( 223      ( 07 Sep 2022 05:57 )             38.3       09-07    135  |  100  |  21  ----------------------------<  99  4.2   |  22  |  1.16    Ca    8.8      07 Sep 2022 05:57  Phos  3.7     09-07  Mg     2.0     09-07      Vital Signs Last 24 Hrs  T(C): 36.6 (08 Sep 2022 05:44), Max: 36.6 (07 Sep 2022 17:25)  T(F): 97.9 (08 Sep 2022 05:44), Max: 97.9 (07 Sep 2022 17:25)  HR: 73 (08 Sep 2022 08:21) (73 - 85)  BP: 164/96 (08 Sep 2022 08:21) (140/70 - 179/104)  BP(mean): 121 (08 Sep 2022 08:21) (98 - 136)  RR: 18 (08 Sep 2022 08:21) (15 - 19)  SpO2: 96% (08 Sep 2022 08:21) (94% - 98%)    Parameters below as of 08 Sep 2022 08:21  Patient On (Oxygen Delivery Method): room air        MEDICATIONS  (STANDING):  apixaban 5 milliGRAM(s) Oral every 12 hours  atorvastatin 80 milliGRAM(s) Oral at bedtime  gabapentin 200 milliGRAM(s) Oral daily  lisinopril 20 milliGRAM(s) Oral daily  metoprolol succinate ER 25 milliGRAM(s) Oral daily  pantoprazole    Tablet 40 milliGRAM(s) Oral before breakfast    MEDICATIONS  (PRN):    Currently Undergoing Physical / Occupational Therapy at bedside    Initial Physical / Occupational Therapy Functional Status Assessment :       Previous Level of Function:     · Additional Comments	pt states that he lives w/ friend in an elevator access apt building w/ no stairs to enter. Denies use of DME for ambulation but does own a SC if needed. Denies hx of recent falls. states that he was independent in all ADLs prior to this admission    Cognitive Status Examination:   · Orientation	oriented to person, place, time and situation  · Level of Consciousness	alert  · Follows Commands and Answers Questions	100% of the time  · Personal Safety and Judgment	intact    Peripheral Neurovascular:     Neurovascular Assessment:   · LLE	warm; no discoloration; no tingling; no numbness  · RLE	warm; no discoloration; no tingling; no numbness    Range of Motion Exam:   · Range of Motion Examination	no ROM deficits were identified    Manual Muscle Testing:   · Manual Muscle Testing Results	no strength deficits were identified    Bed Mobility: Rolling/Turning:     · Level of Carmen	independent    Bed Mobility: Scooting/Bridging:     · Level of Carmen	independent    Bed Mobility: Sit to Supine:     · Level of Carmen	independent    Bed Mobility: Supine to Sit:     · Level of Carmen	independent    Transfer: Bed to Chair:     Transfer Skill: Bed to Chair   · Level of Carmen	independent    Transfer: Chair to Bed:     · Level of Carmen	independent    Transfer: Sit to Stand:     · Level of Carmen	independent    Transfer: Stand to Sit:     · Level of Carmen	independent    Gait Skills:     · Level of Carmen	independent    Stair Negotiation:     · Level of Carmen	NA    Balance Skills Assessment:     · Sitting Balance: Static	normal balance  · Sitting Balance: Dynamic	normal balance  · Sit-to-Stand Balance	normal balance  · Standing Balance: Static	normal balance  · Standing Balance: Dynamic	normal balance    Sensory Examination:   Sensory Examination:    Grossly Intact:   · Gross Sensory Examination	Grossly Intact      Treatment Location:   · Comments	smile, cheek puff, eyebrow raise: symmetrical. visual tracking (H test): smooth pursuit. visual field test (quadrant test): WNL B/L. no DDK noted        Visual Assessment:   · Visual Acuity	Pt wears progressives.  · Visual Tracking	normal; left to right; right to left; up gaze; down gaze  · Visual Neglect	none  · Visual Field Cuts	none  · Eye Muscle Balance	normal  · Visual Scanning	WFL  · Visual Convergence	normal    Fine Motor Coordination:     Grossly Intact:   · Grossly Intact	Left UE; Right UE      Fine Motor Coordination:   · Left Hand, Finger to Nose	normal performance  · Right Hand, Finger to Nose	normal performance  · Left Hand Thumb/Finger Opposition Skills	normal performance  · Right Hand Thumb/Finger Opposition Skills	normal performance  · Left Hand, Manipulation of Objects	normal performance  · Right Hand, Manipulation of Objects	normal performance  · Left Hand, Diadochokinesis Skills	normal performance  · Right Hand, Diadochokinesis Skills	normal performance    Lower Body Dressing Training:     · Level of Carmen	independent; doffing of pants and donning/doffing socks    Toilet Hygiene Training:     · Level of Carmen	independent    Grooming Training:     · Level of Carmen	independent    Eating/Self-Feeding Training:     · Level of Carmen	independent      PM&R Impression : as above    Current Disposition Plan Recommendations :    d/c home with no post discharge rehab needs

## 2022-09-08 NOTE — DISCHARGE NOTE NURSING/CASE MANAGEMENT/SOCIAL WORK - NSDCVIVACCINE_GEN_ALL_CORE_FT
Tdap; 11-Jun-2016 02:22; Efren Segura (RN); [employer]; [id number]; IntraMuscular; Deltoid Right.; 0.5 milliLiter(s); VIS (VIS Published: 09-May-2013, VIS Presented: 11-Jun-2016);   Tdap; 23-Jun-2020 13:40; Iesha Kimble); [employer]; [id number] (Exp. Date: 04-Apr-2022); IntraMuscular; Deltoid Right.; 0.5 milliLiter(s); VIS (VIS Published: 09-May-2013, VIS Presented: 23-Jun-2020);

## 2022-09-08 NOTE — DISCHARGE NOTE NURSING/CASE MANAGEMENT/SOCIAL WORK - PATIENT PORTAL LINK FT
You can access the FollowMyHealth Patient Portal offered by [facility] by registering at the following website: [email or web address]. By joining [facility]’s FollowMyHealth portal, you will also be able to view your health information using other applications (apps) compatible with our system.

## 2022-09-08 NOTE — DISCHARGE NOTE NURSING/CASE MANAGEMENT/SOCIAL WORK - NSDCPEFALRISK_GEN_ALL_CORE
For information on Fall & Injury Prevention, visit: [email or web address] OR  [email or web address] OR  https://www.cdc.gov/steadi/patient.html

## 2022-09-08 NOTE — PROVIDER CONTACT NOTE (OTHER) - ACTION/TREATMENT ORDERED:
No interventions at this time as per stroke ACP. Continue to monitor.
Labetalol to be given, repeat BP after Labetalol given.

## 2022-09-08 NOTE — PROGRESS NOTE ADULT - ASSESSMENT
per Neurology    87 y o Male with PMHx of HTN (lisinopril 20mg), afib (eliquis 2.5mg BID), prostate CA s/p radiation, anemia (ferrous sulfate 325mg), Hepatitis B (treated), left hip arthritis, chronic diastolic HF with severe AS s/p TAVR in 2018 with LBBB (metoprolol succinate ER 25mg daily) coming in with 5 minutes of dysphagia, dysarthria, and questionable R facial droop witnessed by friend/caretaker which started at 6pm. CTH shows no acute intracranial hemorrhage or transcortical infarction. CTP negative for perfusion deficit. CTA H/N shows no hemodynamically significant large vessel extracranial stenosis or occlusion. NIHSS 2.     Neuro  #CVA workup  - with patient's worsening dyarthria, will increase eliquis 5mg BID for now until MRI   - continue atorvastatin 80mg daily  - q4hr stroke neuro checks and vitals  - obtain MRI Brain without contrast - if presence of amyloid (signified by old microhemorrhages), would consider Watchman as AC unable to be restarted due to bleed risk  - Stroke Code HCT Results: neg  - Stroke Code CTA Results: neg  - FHx: father  of hemorrhage, daughter with provoked DVT after international flight  - Stroke education    Cards  #HTN  - permissive hypertension, Goal -180  - hold home blood pressure medication for now  - obtain TTE with bubble/DAYNE- although with known afib, if DAYNE positive for smoke in [place], then would increased eliquis to 5mg BID for further stroke prevention as cerebral event (TIA vs stroke) with compliant eliquis use at lower dose  - Stroke Code EKG Results: f/u    #HLD  - high dose statin as above in CVA  - LDL results: 117    Pulm  - call provider if SPO2 < 94%    GI  #Nutrition/Fluids/Electrolytes   - replete K<4 and Mg <2  - Diet: DASH  - IVF: none    Endocrine  - A1C results: 5.8  - TSH results: 3.75    DVT Prophylaxis  - eliquis for DVT prophylaxis   - SCDs for DVT prophylaxis

## 2022-09-08 NOTE — PROGRESS NOTE ADULT - SUBJECTIVE AND OBJECTIVE BOX
Patient is a 87y old  Male who presents with a chief complaint of     INTERVAL HPI/OVERNIGHT EVENTS:    Pt. seen and examined earlier today  Pt. feels well  Pt. reports occasional difficulty pronouncing certain consonants, but otherwise feels his speech is back to normal  No new complaints    Review of Systems: 12 point review of systems otherwise negative    MEDICATIONS  (STANDING):  apixaban 5 milliGRAM(s) Oral every 12 hours  atorvastatin 80 milliGRAM(s) Oral at bedtime  gabapentin 200 milliGRAM(s) Oral daily  lisinopril 20 milliGRAM(s) Oral daily  metoprolol succinate ER 25 milliGRAM(s) Oral daily  pantoprazole    Tablet 40 milliGRAM(s) Oral before breakfast    MEDICATIONS  (PRN):      Allergies    No Known Allergies    Intolerances          Vital Signs Last 24 Hrs  T(C): 36.6 (08 Sep 2022 05:44), Max: 36.9 (07 Sep 2022 13:41)  T(F): 97.9 (08 Sep 2022 05:44), Max: 98.4 (07 Sep 2022 13:41)  HR: 73 (08 Sep 2022 08:21) (73 - 85)  BP: 164/96 (08 Sep 2022 08:21) (140/70 - 179/104)  BP(mean): 121 (08 Sep 2022 08:21) (98 - 136)  RR: 18 (08 Sep 2022 08:21) (15 - 19)  SpO2: 96% (08 Sep 2022 08:21) (93% - 98%)    Parameters below as of 08 Sep 2022 08:21  Patient On (Oxygen Delivery Method): room air      CAPILLARY BLOOD GLUCOSE          09-07 @ 07:01  -  09-08 @ 07:00  --------------------------------------------------------  IN: 0 mL / OUT: 750 mL / NET: -750 mL        Physical Exam:  (earlier today)  Daily     Daily   General:  well-appearing in NAD  HEENT:  MMM  CV:  irregular S1S2  Lungs:  CTA B/L anteriorly  Abdomen:  soft NT ND  Extremities:  no edema B/L LE  Skin:  WWP  Neuro:  AAOx3    LABS:                        12.9   10.38 )-----------( 223      ( 07 Sep 2022 05:57 )             38.3     09-07    135  |  100  |  21  ----------------------------<  99  4.2   |  22  |  1.16    Ca    8.8      07 Sep 2022 05:57  Phos  3.7     09-07  Mg     2.0     09-07

## 2022-09-08 NOTE — PROGRESS NOTE ADULT - PROBLEM SELECTOR PLAN 4
HbA1c 5.8%; monitor blood glucose; repeat HbA1c in 3 months
HbA1c 5.8%; monitor blood glucose; repeat HbA1c in 3 months

## 2022-09-08 NOTE — PROGRESS NOTE ADULT - PROBLEM SELECTOR PLAN 1
MRI shows "small recent infarct involves the right centrum semiovale, with background of moderate chronic small vessel ischemic change throughout cerebral white matter;" dysarthria improved; cont. work-up and mgmt per Neuro; PT/OT, speech therapy; on DOAC + statin; DOAC dose increased, per Neuro
a/w dysphagia and possible R facial droop; sx imprpved; concerning for CVA; cont. work-up and mgmt per Neuro; on DOAC + statin; DOAC dose increased, per Neuro; plan for MRI brain, TTE, and DAYNE

## 2022-09-14 NOTE — HISTORY OF PRESENT ILLNESS
[de-identified] : 87 year old male with HTN, history of prostate cancer 10 years ago s/p radiation, chronic diastolic heart failure with AS s/p TAVR 2018 with LBBB s/p EP study with no need for PPM and atrial fibrillation with ILR, who presents for follow up.\par \par He is on eliquis and is tolerating it.  Watchman procedure was discussed in the past in setting of multiple falls but no recent issues or syncope.  He notes fatigue that is overall stable but states is maybe a "tiny bit worse" since his last visit.  He still has lightheadedness when he takes subway stairs.   No PETERS, chest pain, near syncope or palpitations.

## 2022-09-14 NOTE — DISCUSSION/SUMMARY
[FreeTextEntry1] : 87 year old male with HTN, AS s/p TAVR (2018), LBBB, s/p EP study with no need for PPM, and atrial fibrillation s/p ILR  \par 1-- AFIB:  CHADSVASC score is at least 3 and he is back on low dose Eliquis without issues.  He is now in permanent rate controlled atrial fibrillation and we discussed options of observation or cardioversion +/- antiarrhythmic medications.  At this time he states he overall is feeling well and does not want a procedure but would like to talk this through with his cardiologist Dr. Michel.  If he decides to proceed he will call our office and we will arrange this. \par He will follow up in 2-3 months or sooner if needed and knows to call with any questions or concerns. \par

## 2022-09-14 NOTE — PHYSICAL EXAM
[General Appearance - Well Developed] : well developed [Normal Appearance] : normal appearance [Well Groomed] : well groomed [General Appearance - Well Nourished] : well nourished [No Deformities] : no deformities [General Appearance - In No Acute Distress] : no acute distress [] : no respiratory distress [Respiration, Rhythm And Depth] : normal respiratory rhythm and effort [Exaggerated Use Of Accessory Muscles For Inspiration] : no accessory muscle use [Auscultation Breath Sounds / Voice Sounds] : lungs were clear to auscultation bilaterally [Clean] : clean [Dry] : dry [Well-Healed] : well-healed [FreeTextEntry1] : irregular, +systolic murmur 2/6. Mild LE edema pitting.  [Palpable Crepitus] : no palpable crepitus [Bleeding] : no active bleeding [Foul Odor] : no foul smell [Purulent Drainage] : no purulent drainage [Serous Drainage] : no serous drainage [Erythema] : not erythematous [Warm] : not warm [Tender] : not tender [Indurated] : not indurated [Fluctuant] : not fluctuant

## 2022-09-14 NOTE — CARDIOLOGY SUMMARY
[de-identified] : 6/11/2011: LVEF 63%. Borderline LVH. Moderate LA enlargement.  s/p transcatheter AVR (bioprosthetic). No pericardial effusion.

## 2022-09-14 NOTE — PROCEDURE
[de-identified] : [employer] REVEAL LINQ II -- implanted in 6/21/2021. \par [id number]\par battery good\par sensing good\par afib now permanent since 7/2022 - rate controlled

## 2022-09-14 NOTE — ADDENDUM
September 16, 2020      Araceli S Katie  [id number]  [facility] [place] [address]        Dear drea,    This letter is regarding your recent Pap smear and Human Papillomavirus (HPV) test.    Your results showed HPV positive.     About 80 percent of women have been exposed to HPV throughout their lifetime. There is no medication for the treatment of HPV. Typically, your own immune system gets rid of the virus before it does harm. HPV is spread by direct skin-to-skin contact, including sexual intercourse, oral sex, anal sex, or any other contact involving the genital area (example: hand to genital contact). It is not possible to become infected with HPV by touching an object, such as a toilet seat. Most people who are infected with HPV have no signs or symptoms.    Things that you can do to boost your immune system and help your body get rid of HPV: get plenty of rest, eat a well-balanced diet of healthy foods, stop smoking, exercise regularly and decrease stress.    It is recommended that you have your next Pap smear and Human Papillomavirus (HPV) test in 1 year.    If you have additional questions regarding this result, please contact our Registered Nurse, Aditi, at [phone].      Sincerely,    Your [facility] Care Team   [FreeTextEntry1] : I, Johnny Barboza, hereby attest that the medical record entry for this patient accurately reflects signatures/notations that I made on the Date of Service in my capacity as an Attending Physician when I treated/diagnosed the above patient. I do hereby attest that this information is true, accurate and complete to the best of my knowledge and I understand that any falsification, omission, or concealment of material fact may subject me to administrative, civil, or, criminal liability. I agree with the note as written by my PA in its entirety.\par I was present for the entire visit and supervised the entire visit and agree with the plan as outlined.\par \par

## 2022-09-14 NOTE — REVIEW OF SYSTEMS
[Negative] : Neurological [Fever] : no fever [Weight Gain (___ Lbs)] : no recent weight gain [Chills] : no chills [Weight Loss (___ Lbs)] : no recent weight loss [SOB] : no shortness of breath [Dyspnea on exertion] : not dyspnea during exertion [Chest Discomfort] : no chest discomfort [Palpitations] : no palpitations [Syncope] : no syncope

## 2022-09-22 NOTE — HISTORY OF PRESENT ILLNESS
[FreeTextEntry1] : 87y Male with PMHx of HTN (lisinopril 20mg), afib (eliquis 2.5mg BID), prostate CA s/p radiation, anemia (ferrous sulfate 325mg), Hepatitis B (treated), left hip arthritis, chronic diastolic HF with severe AS s/p TAVR in 2018 with LBBB (metoprolol succinate ER 25mg daily) coming in with 5 minutes of dysphagia, dysarthria, and questionable R facial droop. Initial CT, CTA head and neck, and CT perfusion were unremarkable. Patient underwent MRI which demonstrated a small recent infarct involves the right centrum semiovale. DAYNE showed no PFO, EF 55-60%. On 9/7, patient was noted to be increasingly dysarthric and had a left upper extremity parietal drift; Eliquis was increased to 5mg twice a day.\par \par During this hospital course, patient had an ischemic stroke located in the\par right centrum semiovale as seen on MRI.

## 2022-09-22 NOTE — REASON FOR VISIT
[Home] : at home, [unfilled] , at the time of the visit. [Patient] : the patient [Post Hospitalization] : a post hospitalization visit

## 2022-10-14 PROBLEM — I63.9 ISCHEMIC STROKE: Status: ACTIVE | Noted: 2022-01-01

## 2022-10-14 NOTE — PHYSICAL EXAM
[FreeTextEntry1] : Alert.  Fully oriented.  Speech is slightly dysarthric but easily understood.  Cranial nerves II-XII are intact.  Motor exam reveals intact strength with individual muscle testing in bilateral upper and lower extremities. He has some limitation at the shoulders, L>R. Sensation is intact to light touch in distal extremities.  Finger-to-nose and heel-to-shin are intact. Gait is slight stooped, unsteady.

## 2022-10-14 NOTE — HISTORY OF PRESENT ILLNESS
[FreeTextEntry1] : The patient is an 87-year-old gentleman with a history of hypertension, atrial fibrillation, prostate cancer status post radiation, diastolic heart failure with severe AS status post TAVR in 2018, and anemia.  He presents for follow-up after recent hospitalization for stroke.\par \par Briefly, the patient was admitted 9/5/2022 after developing transient episode of dysarthria, dysphagia, and possible right facial droop.  CT, CTA head/neck, and CTP were unremarkable.  MRI showed a small, subacute appearing infarct in the right centrum semiovale.  TTE was unremarkable but ADYNE did show "sludge" in the left atrial appendage.  His TAVR valve appeared normal.  Given these findings, his Eliquis was increased from 2.5 mg twice daily (dosed for age, weight) to 5 mg twice daily.  He was started on 80 mg of atorvastatin (total cholesterol 191, ).  Of note, he has previously had discussions about the watchman procedure given a history of multiple falls.\par \par The patient states that since discharge, he has done well.  He is tolerating his medications apart from more bruising on the higher dose of Eliquis.  There is otherwise been no bleeding issues.  He is curious if he would benefit from speech therapy.

## 2022-10-14 NOTE — ASSESSMENT
[FreeTextEntry1] : The patient is an 87-year-old gentleman with a history of hypertension, atrial fibrillation, prostate cancer status post radiation, diastolic heart failure with severe AS status post TAVR in 2018, and anemia.  He presents for follow-up after recent hospitalization for stroke and is doing well, tolerating higher dosing of Eliquis and statin therapy.  He will continue Eliquis 5 mg twice daily for now.  He has run out of the atorvastatin 80 mg daily; I will send a prescription for atorvastatin 40 mg daily.  Plan will be to discuss need for repeat echocardiogram to ensure this "sludge" has resolved on higher dose of anticoagulation and that the patient is getting full benefit from DOAC.  Given prior history of recurrent falls, the watchman procedure certainly is a consideration as well. [facility] referral sent.  Patient will return to clinic in 2 to 3 months with Dr. Viveros.

## 2023-01-01 ENCOUNTER — APPOINTMENT (OUTPATIENT)
Dept: HEART AND VASCULAR | Facility: CLINIC | Age: 88
End: 2023-01-01

## 2023-01-01 ENCOUNTER — APPOINTMENT (OUTPATIENT)
Dept: NEUROLOGY | Facility: CLINIC | Age: 88
End: 2023-01-01

## 2023-01-01 ENCOUNTER — FORM ENCOUNTER (OUTPATIENT)
Age: 88
End: 2023-01-01

## 2023-02-24 ENCOUNTER — APPOINTMENT (OUTPATIENT)
Dept: HEART AND VASCULAR | Facility: CLINIC | Age: 88
End: 2023-02-24

## 2023-03-02 ENCOUNTER — APPOINTMENT (OUTPATIENT)
Dept: HEART AND VASCULAR | Facility: CLINIC | Age: 88
End: 2023-03-02

## 2023-05-25 NOTE — PATIENT PROFILE ADULT. - ANESTHESIA, PREVIOUS REACTION, PROFILE
In an effort to ensure that our patients [employer]Well, a Team Member has reviewed your chart and identified an opportunity to provide the best care possible. An attempt was made to discuss or schedule overdue Preventive or Disease Management screening.     The Outcome was Contact was not made, left message If you have any questions or need help with scheduling, contact your primary care provider.. Care Gaps include Breast Cancer Screening.     none

## 2024-01-12 NOTE — ED PROCEDURE NOTE - CPROC ED SITE PREP1
Received from [facility] papers for patient.    Papers given to Dr FARRAR as he was the doctor who seen the patient while he was sick.       normal saline

## 2024-06-27 NOTE — DISCHARGE NOTE ADULT - IF YOU ARE A SMOKER, IT IS IMPORTANT FOR YOUR HEALTH TO STOP SMOKING. PLEASE BE AWARE THAT SECOND HAND SMOKE IS ALSO HARMFUL.
Plan to collect imaging.  Order to be provided.    Handout for thyroid function labs to be provided.    Follow-up with Endocrinology clinic in 6 months, pending evaluation results.  Please alert Endocrinology clinic if symptoms of hypothyroidism (including tiredness, unexpected weight gain, feeling cold, dry skin, hair loss, constipation) and overt hyperthyroidism (including unexpected weight loss despite a typical or even an increased appetite, excessive sweating, feeling too warm when others are comfortable, rapid heart rate or heart palpitations, poor school performance, mood swings, difficulty sleeping, bulging or prominence of the eyes, tremors of the hands, hyperactivity or restlessness and increased frequency of bowel movements or diarrhea) are noted prior to follow-up appointment.     Statement Selected

## 2024-08-20 NOTE — ADDENDUM
[FreeTextEntry1] : I, Johnny Barboza, hereby attest that the medical record entry for this patient accurately reflects signatures/notations that I made on the Date of Service in my capacity as an Attending Physician when I treated/diagnosed the above patient. I do hereby attest that this information is true, accurate and complete to the best of my knowledge and I understand that any falsification, omission, or concealment of material fact may subject me to administrative, civil, or, criminal liability. I agree with the note as written by my PA in its entirety.\par I was present for the entire visit and supervised the entire visit and agree with the plan as outlined.\par \par  no

## 2024-11-12 NOTE — STROKE CODE NOTE - STROKE TEAM ASSESSMENT
Cardiac Catheterization Appropriate Use    History of Heart Failure? [x]  Yes  []  No     Newly Diagnosed? [x]  Yes  []  No     NYHA class    [] I  [] II  [] III  [] IV            Stress Test Peformed * Important for AUC criteria []  Yes  []  No         If yes, specify test performed and must include risk of ischemia      StressTest Type   Test Result   Risk of Ischemia   [] Standard Exercise ST             (without imaging)  [] Stress Echo  [] ST Nuclear   [] ST with CMR    [] Positive                   []Negative  [] Indeterminate  [] Unavailable [] High   [] Intermediate  [] Low  [] Unavailable        Indication(s) for Cath Lab Visit  (select all that apply)    [] ACS  [] New onset angina<=2 months  [] Worsening Angina  [] Resuscitated Cardiac Arrest   [] Stable Known CAD  [] Suspected CAD  [] Valvular Disease  [] Pericardial Disease  [] Pre-Operative Evaluation  []  Syncope []Post-Cardiac Transplant  []Cardiac Arrhythmias   [] Cardiomyopathy  [] LV dysfunction  [] Evaluation for Exercise Clearance  [] Other         Chest Pain Symptoms     [] Typical Angina      []   Atypical                                Angina      [] Non-anginal Chest Pain []Asymptomatic      Cardiovascular Instability  [] Yes  []  No         If yes, specify instability type  (select all that apply)    [] Persistent Ischemic                     Symptoms(chest pain)   [] Hemodynamic Instability(not        Cardiogenic shock)  [] Cardiogenic Shock  [] Refractory Cardiogenic Shock   [] Acute Heart Failure Symptoms  [] Ventricular Arrhythmia        Evaluation for Surgery []  Cardiac Surgery        []  Non-Cardiac Surgery      Functional Capacity []<4 METS  []>= 4 METS without symptoms  []  >=4 mets with symptoms    []  Unknown     Surgical Risk []  Low     []  Intermediate    []High Risk Vascular  []  High Risk Non-Vascular                         05-Sep-2022 19:00

## 2024-11-13 NOTE — PATIENT PROFILE ADULT. - AGENT'S NAME
Hmg CoA Reductase Inhibitors (Statin) Refill Protocol - Protocol Passed     To non-clinical    Patient due for an appointment with PCP  PLEASE SCHEDULE.    Per last OV with BDO on 5/22/24  Follow-up: in 6 months    Guillermina Sousa

## 2024-12-11 NOTE — CONSULT NOTE ADULT - PROBLEM SELECTOR PROBLEM 5
The Open Access order in the GI workqueue requested on 10/30/24  by Vicyk Perez MD has been removed as, unable to contact.   Ordering provider has been notified.     Please contact patient, if further communication is needed.   Chronic heart failure with preserved ejection fraction (HFpEF)